# Patient Record
Sex: MALE | Race: WHITE | Employment: OTHER | URBAN - METROPOLITAN AREA
[De-identification: names, ages, dates, MRNs, and addresses within clinical notes are randomized per-mention and may not be internally consistent; named-entity substitution may affect disease eponyms.]

---

## 2019-09-17 ENCOUNTER — APPOINTMENT (OUTPATIENT)
Dept: CT IMAGING | Age: 69
End: 2019-09-17
Payer: MEDICARE

## 2019-09-17 ENCOUNTER — HOSPITAL ENCOUNTER (EMERGENCY)
Age: 69
Discharge: HOME | End: 2019-09-17
Attending: EMERGENCY MEDICINE
Payer: MEDICARE

## 2019-09-17 ENCOUNTER — APPOINTMENT (OUTPATIENT)
Dept: MRI IMAGING | Age: 69
End: 2019-09-17
Payer: MEDICARE

## 2019-09-17 ENCOUNTER — APPOINTMENT (OUTPATIENT)
Dept: GENERAL RADIOLOGY | Age: 69
End: 2019-09-17
Payer: MEDICARE

## 2019-09-17 VITALS
HEIGHT: 69 IN | BODY MASS INDEX: 25.92 KG/M2 | DIASTOLIC BLOOD PRESSURE: 74 MMHG | RESPIRATION RATE: 14 BRPM | TEMPERATURE: 97.6 F | HEART RATE: 73 BPM | OXYGEN SATURATION: 94 % | SYSTOLIC BLOOD PRESSURE: 114 MMHG | WEIGHT: 175 LBS

## 2019-09-17 DIAGNOSIS — R53.83 OTHER FATIGUE: Primary | ICD-10-CM

## 2019-09-17 LAB
ALBUMIN SERPL-MCNC: 3.7 GM/DL (ref 3.4–5)
ALP BLD-CCNC: 47 IU/L (ref 40–129)
ALT SERPL-CCNC: 14 U/L (ref 10–40)
AMMONIA: 26 UMOL/L (ref 16–60)
AMPHETAMINES: NEGATIVE
ANION GAP SERPL CALCULATED.3IONS-SCNC: 9 MMOL/L (ref 4–16)
AST SERPL-CCNC: 13 IU/L (ref 15–37)
BACTERIA: NEGATIVE /HPF
BARBITURATE SCREEN URINE: NEGATIVE
BASOPHILS ABSOLUTE: 0.1 K/CU MM
BASOPHILS RELATIVE PERCENT: 0.6 % (ref 0–1)
BENZODIAZEPINE SCREEN, URINE: NEGATIVE
BILIRUB SERPL-MCNC: 0.2 MG/DL (ref 0–1)
BILIRUBIN URINE: NEGATIVE MG/DL
BLOOD, URINE: NEGATIVE
BUN BLDV-MCNC: 12 MG/DL (ref 6–23)
CALCIUM SERPL-MCNC: 8.8 MG/DL (ref 8.3–10.6)
CANNABINOID SCREEN URINE: ABNORMAL
CHLORIDE BLD-SCNC: 100 MMOL/L (ref 99–110)
CLARITY: CLEAR
CO2: 22 MMOL/L (ref 21–32)
COCAINE METABOLITE: NEGATIVE
COLOR: ABNORMAL
CREAT SERPL-MCNC: 0.9 MG/DL (ref 0.9–1.3)
DIFFERENTIAL TYPE: ABNORMAL
EOSINOPHILS ABSOLUTE: 0.2 K/CU MM
EOSINOPHILS RELATIVE PERCENT: 2.4 % (ref 0–3)
GFR AFRICAN AMERICAN: >60 ML/MIN/1.73M2
GFR NON-AFRICAN AMERICAN: >60 ML/MIN/1.73M2
GLUCOSE BLD-MCNC: 115 MG/DL (ref 70–99)
GLUCOSE BLD-MCNC: 123 MG/DL (ref 70–99)
GLUCOSE, URINE: NEGATIVE MG/DL
HCT VFR BLD CALC: 36.8 % (ref 42–52)
HEMOGLOBIN: 12.1 GM/DL (ref 13.5–18)
HYALINE CASTS: 2 /LPF
IMMATURE NEUTROPHIL %: 0.3 % (ref 0–0.43)
KETONES, URINE: ABNORMAL MG/DL
LACTATE: 0.9 MMOL/L (ref 0.4–2)
LEUKOCYTE ESTERASE, URINE: NEGATIVE
LYMPHOCYTES ABSOLUTE: 2.2 K/CU MM
LYMPHOCYTES RELATIVE PERCENT: 27.5 % (ref 24–44)
MAGNESIUM: 2 MG/DL (ref 1.8–2.4)
MCH RBC QN AUTO: 31.8 PG (ref 27–31)
MCHC RBC AUTO-ENTMCNC: 32.9 % (ref 32–36)
MCV RBC AUTO: 96.8 FL (ref 78–100)
MONOCYTES ABSOLUTE: 0.6 K/CU MM
MONOCYTES RELATIVE PERCENT: 7.6 % (ref 0–4)
MUCUS: ABNORMAL HPF
NITRITE URINE, QUANTITATIVE: NEGATIVE
NUCLEATED RBC %: 0 %
OPIATES, URINE: NEGATIVE
OXYCODONE: ABNORMAL
PDW BLD-RTO: 12.9 % (ref 11.7–14.9)
PH, URINE: 6 (ref 5–8)
PHENCYCLIDINE, URINE: NEGATIVE
PHOSPHORUS: 2.6 MG/DL (ref 2.5–4.9)
PLATELET # BLD: 282 K/CU MM (ref 140–440)
PMV BLD AUTO: 9.3 FL (ref 7.5–11.1)
POTASSIUM SERPL-SCNC: 3.7 MMOL/L (ref 3.5–5.1)
PROTEIN UA: 30 MG/DL
RBC # BLD: 3.8 M/CU MM (ref 4.6–6.2)
RBC URINE: 1 /HPF (ref 0–3)
SEGMENTED NEUTROPHILS ABSOLUTE COUNT: 4.9 K/CU MM
SEGMENTED NEUTROPHILS RELATIVE PERCENT: 61.6 % (ref 36–66)
SODIUM BLD-SCNC: 131 MMOL/L (ref 135–145)
SPECIFIC GRAVITY UA: 1.02 (ref 1–1.03)
TOTAL IMMATURE NEUTOROPHIL: 0.02 K/CU MM
TOTAL NUCLEATED RBC: 0 K/CU MM
TOTAL PROTEIN: 6.5 GM/DL (ref 6.4–8.2)
TRICHOMONAS: ABNORMAL /HPF
TROPONIN T: <0.01 NG/ML
TSH HIGH SENSITIVITY: 1.02 UIU/ML (ref 0.27–4.2)
UROBILINOGEN, URINE: 1 MG/DL (ref 0.2–1)
WBC # BLD: 7.9 K/CU MM (ref 4–10.5)
WBC UA: 1 /HPF (ref 0–2)

## 2019-09-17 PROCEDURE — A9579 GAD-BASE MR CONTRAST NOS,1ML: HCPCS | Performed by: EMERGENCY MEDICINE

## 2019-09-17 PROCEDURE — 2580000003 HC RX 258: Performed by: EMERGENCY MEDICINE

## 2019-09-17 PROCEDURE — 36415 COLL VENOUS BLD VENIPUNCTURE: CPT

## 2019-09-17 PROCEDURE — 70450 CT HEAD/BRAIN W/O DYE: CPT

## 2019-09-17 PROCEDURE — 84100 ASSAY OF PHOSPHORUS: CPT

## 2019-09-17 PROCEDURE — 81001 URINALYSIS AUTO W/SCOPE: CPT

## 2019-09-17 PROCEDURE — 96361 HYDRATE IV INFUSION ADD-ON: CPT

## 2019-09-17 PROCEDURE — 6360000004 HC RX CONTRAST MEDICATION: Performed by: EMERGENCY MEDICINE

## 2019-09-17 PROCEDURE — 93005 ELECTROCARDIOGRAM TRACING: CPT | Performed by: EMERGENCY MEDICINE

## 2019-09-17 PROCEDURE — 83605 ASSAY OF LACTIC ACID: CPT

## 2019-09-17 PROCEDURE — 85025 COMPLETE CBC W/AUTO DIFF WBC: CPT

## 2019-09-17 PROCEDURE — 99285 EMERGENCY DEPT VISIT HI MDM: CPT

## 2019-09-17 PROCEDURE — 70553 MRI BRAIN STEM W/O & W/DYE: CPT

## 2019-09-17 PROCEDURE — 6360000002 HC RX W HCPCS

## 2019-09-17 PROCEDURE — 96374 THER/PROPH/DIAG INJ IV PUSH: CPT

## 2019-09-17 PROCEDURE — 87040 BLOOD CULTURE FOR BACTERIA: CPT

## 2019-09-17 PROCEDURE — 82962 GLUCOSE BLOOD TEST: CPT

## 2019-09-17 PROCEDURE — 80053 COMPREHEN METABOLIC PANEL: CPT

## 2019-09-17 PROCEDURE — 84484 ASSAY OF TROPONIN QUANT: CPT

## 2019-09-17 PROCEDURE — 87086 URINE CULTURE/COLONY COUNT: CPT

## 2019-09-17 PROCEDURE — 71045 X-RAY EXAM CHEST 1 VIEW: CPT

## 2019-09-17 PROCEDURE — 83735 ASSAY OF MAGNESIUM: CPT

## 2019-09-17 PROCEDURE — 80307 DRUG TEST PRSMV CHEM ANLYZR: CPT

## 2019-09-17 PROCEDURE — 82140 ASSAY OF AMMONIA: CPT

## 2019-09-17 PROCEDURE — 84443 ASSAY THYROID STIM HORMONE: CPT

## 2019-09-17 RX ORDER — RANOLAZINE 1000 MG/1
1000 TABLET, EXTENDED RELEASE ORAL 2 TIMES DAILY
COMMUNITY

## 2019-09-17 RX ORDER — ONDANSETRON 2 MG/ML
4 INJECTION INTRAMUSCULAR; INTRAVENOUS EVERY 6 HOURS PRN
Status: DISCONTINUED | OUTPATIENT
Start: 2019-09-17 | End: 2019-09-17 | Stop reason: HOSPADM

## 2019-09-17 RX ORDER — ATORVASTATIN CALCIUM 80 MG/1
80 TABLET, FILM COATED ORAL DAILY
COMMUNITY
End: 2020-06-10

## 2019-09-17 RX ORDER — 0.9 % SODIUM CHLORIDE 0.9 %
1000 INTRAVENOUS SOLUTION INTRAVENOUS ONCE
Status: COMPLETED | OUTPATIENT
Start: 2019-09-17 | End: 2019-09-17

## 2019-09-17 RX ORDER — ONDANSETRON 2 MG/ML
INJECTION INTRAMUSCULAR; INTRAVENOUS
Status: COMPLETED
Start: 2019-09-17 | End: 2019-09-17

## 2019-09-17 RX ADMIN — SODIUM CHLORIDE 1000 ML: 9 INJECTION, SOLUTION INTRAVENOUS at 12:45

## 2019-09-17 RX ADMIN — ONDANSETRON 4 MG: 2 INJECTION INTRAMUSCULAR; INTRAVENOUS at 12:46

## 2019-09-17 RX ADMIN — GADOTERIDOL 15 ML: 279.3 INJECTION, SOLUTION INTRAVENOUS at 16:15

## 2019-09-17 NOTE — ED NOTES
Discharge instructions reviewed with patient. follow up was discussed. Patient denies further questions and verbalizes understanding. Rides plus set up for patient,.       Carmen Craig RN  09/17/19 7237

## 2019-09-17 NOTE — PROGRESS NOTES
noncompliance, medication cost, therapy complete etc.):   Plavix no longer taking  Metoprolol succinate no longer taking  Simvastatin no longer taking    Other Comments:  Reviewed and updated med list per list sent from [address] [place] [address] Way does not know patients medications or last doses taken and patient unable to provide at time of assessment    To my knowledge the above medication history is accurate as of 9/17/2019 6:15 PM.   Domenic Rob CPhT   9/17/2019 6:15 PM

## 2019-09-17 NOTE — ED NOTES
Bed: ED-16  Expected date:   Expected time:   Means of arrival:   Comments:  ems     Enid Perez RN  09/17/19 [id number]

## 2019-09-17 NOTE — ED NOTES
Patient back from MRI. Ap Hyman Currently sleeping.  No signs of distress and call light in reach     Coleen Britton RN  09/17/19 [id number]

## 2019-09-17 NOTE — ED NOTES
Patient to MRI at this time. Family updated and denies needs.       Coleen Britton RN  09/17/19 [id number]

## 2019-09-18 PROCEDURE — 93010 ELECTROCARDIOGRAM REPORT: CPT | Performed by: INTERNAL MEDICINE

## 2019-09-19 LAB
CULTURE: NORMAL
Lab: NORMAL
SPECIMEN: NORMAL

## 2019-09-20 LAB
EKG ATRIAL RATE: 79 BPM
EKG DIAGNOSIS: NORMAL
EKG P AXIS: 38 DEGREES
EKG P-R INTERVAL: 180 MS
EKG Q-T INTERVAL: 390 MS
EKG QRS DURATION: 88 MS
EKG QTC CALCULATION (BAZETT): 447 MS
EKG R AXIS: 3 DEGREES
EKG T AXIS: 42 DEGREES
EKG VENTRICULAR RATE: 79 BPM

## 2019-09-22 LAB
CULTURE: NORMAL
CULTURE: NORMAL
Lab: NORMAL
Lab: NORMAL
SPECIMEN: NORMAL
SPECIMEN: NORMAL

## 2019-10-13 ENCOUNTER — HOSPITAL ENCOUNTER (OUTPATIENT)
Age: 69
Setting detail: OBSERVATION
Discharge: HOME | End: 2019-10-14
Attending: EMERGENCY MEDICINE | Admitting: INTERNAL MEDICINE
Payer: MEDICARE

## 2019-10-13 ENCOUNTER — APPOINTMENT (OUTPATIENT)
Dept: GENERAL RADIOLOGY | Age: 69
End: 2019-10-13
Payer: MEDICARE

## 2019-10-13 DIAGNOSIS — R07.9 CHEST PAIN, UNSPECIFIED TYPE: Primary | ICD-10-CM

## 2019-10-13 DIAGNOSIS — R91.8 ABNORMALITY OF LUNG ON CXR: ICD-10-CM

## 2019-10-13 LAB
ALBUMIN SERPL-MCNC: 4.1 GM/DL (ref 3.4–5)
ALP BLD-CCNC: 50 IU/L (ref 40–129)
ALT SERPL-CCNC: 16 U/L (ref 10–40)
ANION GAP SERPL CALCULATED.3IONS-SCNC: 6 MMOL/L (ref 4–16)
AST SERPL-CCNC: 14 IU/L (ref 15–37)
BASOPHILS ABSOLUTE: 0.1 K/CU MM
BASOPHILS RELATIVE PERCENT: 0.9 % (ref 0–1)
BILIRUB SERPL-MCNC: 0.2 MG/DL (ref 0–1)
BUN BLDV-MCNC: 10 MG/DL (ref 6–23)
CALCIUM SERPL-MCNC: 8.7 MG/DL (ref 8.3–10.6)
CHLORIDE BLD-SCNC: 102 MMOL/L (ref 99–110)
CO2: 28 MMOL/L (ref 21–32)
CREAT SERPL-MCNC: 0.9 MG/DL (ref 0.9–1.3)
DIFFERENTIAL TYPE: ABNORMAL
EOSINOPHILS ABSOLUTE: 0.3 K/CU MM
EOSINOPHILS RELATIVE PERCENT: 3.4 % (ref 0–3)
GFR AFRICAN AMERICAN: >60 ML/MIN/1.73M2
GFR NON-AFRICAN AMERICAN: >60 ML/MIN/1.73M2
GLUCOSE BLD-MCNC: 92 MG/DL (ref 70–99)
HCT VFR BLD CALC: 38.4 % (ref 42–52)
HEMOGLOBIN: 12.6 GM/DL (ref 13.5–18)
IMMATURE NEUTROPHIL %: 0.1 % (ref 0–0.43)
LYMPHOCYTES ABSOLUTE: 3 K/CU MM
LYMPHOCYTES RELATIVE PERCENT: 36.5 % (ref 24–44)
MCH RBC QN AUTO: 32.2 PG (ref 27–31)
MCHC RBC AUTO-ENTMCNC: 32.8 % (ref 32–36)
MCV RBC AUTO: 98.2 FL (ref 78–100)
MONOCYTES ABSOLUTE: 0.8 K/CU MM
MONOCYTES RELATIVE PERCENT: 9.3 % (ref 0–4)
NUCLEATED RBC %: 0 %
PDW BLD-RTO: 13.2 % (ref 11.7–14.9)
PLATELET # BLD: 328 K/CU MM (ref 140–440)
PMV BLD AUTO: 9 FL (ref 7.5–11.1)
POTASSIUM SERPL-SCNC: 3.9 MMOL/L (ref 3.5–5.1)
PRO-BNP: 26.19 PG/ML
RBC # BLD: 3.91 M/CU MM (ref 4.6–6.2)
SEGMENTED NEUTROPHILS ABSOLUTE COUNT: 4.1 K/CU MM
SEGMENTED NEUTROPHILS RELATIVE PERCENT: 49.8 % (ref 36–66)
SODIUM BLD-SCNC: 136 MMOL/L (ref 135–145)
TOTAL IMMATURE NEUTOROPHIL: 0.01 K/CU MM
TOTAL NUCLEATED RBC: 0 K/CU MM
TOTAL PROTEIN: 7 GM/DL (ref 6.4–8.2)
TROPONIN T: <0.01 NG/ML
WBC # BLD: 8.2 K/CU MM (ref 4–10.5)

## 2019-10-13 PROCEDURE — 83880 ASSAY OF NATRIURETIC PEPTIDE: CPT

## 2019-10-13 PROCEDURE — 93005 ELECTROCARDIOGRAM TRACING: CPT | Performed by: PHYSICIAN ASSISTANT

## 2019-10-13 PROCEDURE — 84484 ASSAY OF TROPONIN QUANT: CPT

## 2019-10-13 PROCEDURE — 85025 COMPLETE CBC W/AUTO DIFF WBC: CPT

## 2019-10-13 PROCEDURE — 71045 X-RAY EXAM CHEST 1 VIEW: CPT

## 2019-10-13 PROCEDURE — 99285 EMERGENCY DEPT VISIT HI MDM: CPT

## 2019-10-13 PROCEDURE — 80053 COMPREHEN METABOLIC PANEL: CPT

## 2019-10-13 PROCEDURE — 94761 N-INVAS EAR/PLS OXIMETRY MLT: CPT

## 2019-10-13 PROCEDURE — 6370000000 HC RX 637 (ALT 250 FOR IP): Performed by: PHYSICIAN ASSISTANT

## 2019-10-13 RX ORDER — ASPIRIN 81 MG/1
162 TABLET, CHEWABLE ORAL ONCE
Status: COMPLETED | OUTPATIENT
Start: 2019-10-13 | End: 2019-10-13

## 2019-10-13 RX ADMIN — ASPIRIN 162 MG: 81 TABLET, CHEWABLE ORAL at 23:24

## 2019-10-13 ASSESSMENT — HEART SCORE: ECG: 0

## 2019-10-13 ASSESSMENT — PAIN SCALES - GENERAL: PAINLEVEL_OUTOF10: 7

## 2019-10-13 ASSESSMENT — PAIN DESCRIPTION - LOCATION: LOCATION: CHEST

## 2019-10-14 ENCOUNTER — APPOINTMENT (OUTPATIENT)
Dept: NUCLEAR MEDICINE | Age: 69
End: 2019-10-14
Payer: MEDICARE

## 2019-10-14 ENCOUNTER — APPOINTMENT (OUTPATIENT)
Dept: CT IMAGING | Age: 69
End: 2019-10-14
Payer: MEDICARE

## 2019-10-14 ENCOUNTER — APPOINTMENT (OUTPATIENT)
Dept: ULTRASOUND IMAGING | Age: 69
End: 2019-10-14
Payer: MEDICARE

## 2019-10-14 VITALS
OXYGEN SATURATION: 97 % | DIASTOLIC BLOOD PRESSURE: 67 MMHG | HEIGHT: 69 IN | HEART RATE: 60 BPM | SYSTOLIC BLOOD PRESSURE: 121 MMHG | WEIGHT: 178.5 LBS | BODY MASS INDEX: 26.44 KG/M2 | RESPIRATION RATE: 15 BRPM | TEMPERATURE: 97.5 F

## 2019-10-14 LAB
APTT: 27.5 SECONDS (ref 21.2–33)
BACTERIA: NEGATIVE /HPF
BILIRUBIN URINE: NEGATIVE MG/DL
BLOOD, URINE: NEGATIVE
CLARITY: CLEAR
COLOR: YELLOW
EKG ATRIAL RATE: 58 BPM
EKG ATRIAL RATE: 67 BPM
EKG ATRIAL RATE: 71 BPM
EKG DIAGNOSIS: NORMAL
EKG P AXIS: 27 DEGREES
EKG P AXIS: 42 DEGREES
EKG P AXIS: 65 DEGREES
EKG P-R INTERVAL: 182 MS
EKG P-R INTERVAL: 184 MS
EKG P-R INTERVAL: 186 MS
EKG Q-T INTERVAL: 376 MS
EKG Q-T INTERVAL: 392 MS
EKG Q-T INTERVAL: 414 MS
EKG QRS DURATION: 82 MS
EKG QRS DURATION: 86 MS
EKG QRS DURATION: 86 MS
EKG QTC CALCULATION (BAZETT): 406 MS
EKG QTC CALCULATION (BAZETT): 408 MS
EKG QTC CALCULATION (BAZETT): 414 MS
EKG R AXIS: 20 DEGREES
EKG R AXIS: 21 DEGREES
EKG R AXIS: 5 DEGREES
EKG T AXIS: 29 DEGREES
EKG T AXIS: 48 DEGREES
EKG T AXIS: 54 DEGREES
EKG VENTRICULAR RATE: 58 BPM
EKG VENTRICULAR RATE: 67 BPM
EKG VENTRICULAR RATE: 71 BPM
GLUCOSE, URINE: NEGATIVE MG/DL
HCT VFR BLD CALC: 37.1 % (ref 42–52)
HEMOGLOBIN: 12.1 GM/DL (ref 13.5–18)
INR BLD: 1 INDEX
KETONES, URINE: NEGATIVE MG/DL
LEUKOCYTE ESTERASE, URINE: NEGATIVE
LV EF: 58 %
LV EF: 68 %
LVEF MODALITY: NORMAL
LVEF MODALITY: NORMAL
MCH RBC QN AUTO: 32.1 PG (ref 27–31)
MCHC RBC AUTO-ENTMCNC: 32.6 % (ref 32–36)
MCV RBC AUTO: 98.4 FL (ref 78–100)
NITRITE URINE, QUANTITATIVE: NEGATIVE
PDW BLD-RTO: 13.1 % (ref 11.7–14.9)
PH, URINE: 8 (ref 5–8)
PLATELET # BLD: 292 K/CU MM (ref 140–440)
PMV BLD AUTO: 8.9 FL (ref 7.5–11.1)
PROTEIN UA: NEGATIVE MG/DL
PROTHROMBIN TIME: 11.4 SECONDS (ref 11.7–14.5)
RBC # BLD: 3.77 M/CU MM (ref 4.6–6.2)
RBC URINE: <1 /HPF (ref 0–3)
SPECIFIC GRAVITY UA: 1.04 (ref 1–1.03)
SPECIFIC GRAVITY UA: ABNORMAL (ref 1–1.03)
TRICHOMONAS: ABNORMAL /HPF
TROPONIN T: <0.01 NG/ML
TROPONIN T: <0.01 NG/ML
UROBILINOGEN, URINE: NORMAL MG/DL (ref 0.2–1)
WBC # BLD: 5.7 K/CU MM (ref 4–10.5)
WBC UA: <1 /HPF (ref 0–2)

## 2019-10-14 PROCEDURE — 2580000003 HC RX 258: Performed by: INTERNAL MEDICINE

## 2019-10-14 PROCEDURE — 85610 PROTHROMBIN TIME: CPT

## 2019-10-14 PROCEDURE — 93010 ELECTROCARDIOGRAM REPORT: CPT | Performed by: INTERNAL MEDICINE

## 2019-10-14 PROCEDURE — 6370000000 HC RX 637 (ALT 250 FOR IP): Performed by: INTERNAL MEDICINE

## 2019-10-14 PROCEDURE — 85027 COMPLETE CBC AUTOMATED: CPT

## 2019-10-14 PROCEDURE — 6360000004 HC RX CONTRAST MEDICATION: Performed by: INTERNAL MEDICINE

## 2019-10-14 PROCEDURE — 85730 THROMBOPLASTIN TIME PARTIAL: CPT

## 2019-10-14 PROCEDURE — A9500 TC99M SESTAMIBI: HCPCS | Performed by: INTERNAL MEDICINE

## 2019-10-14 PROCEDURE — 6360000002 HC RX W HCPCS: Performed by: INTERNAL MEDICINE

## 2019-10-14 PROCEDURE — 81001 URINALYSIS AUTO W/SCOPE: CPT

## 2019-10-14 PROCEDURE — 78452 HT MUSCLE IMAGE SPECT MULT: CPT

## 2019-10-14 PROCEDURE — 3430000000 HC RX DIAGNOSTIC RADIOPHARMACEUTICAL: Performed by: INTERNAL MEDICINE

## 2019-10-14 PROCEDURE — 84484 ASSAY OF TROPONIN QUANT: CPT

## 2019-10-14 PROCEDURE — G0378 HOSPITAL OBSERVATION PER HR: HCPCS

## 2019-10-14 PROCEDURE — 93306 TTE W/DOPPLER COMPLETE: CPT

## 2019-10-14 PROCEDURE — 94761 N-INVAS EAR/PLS OXIMETRY MLT: CPT

## 2019-10-14 PROCEDURE — 93017 CV STRESS TEST TRACING ONLY: CPT

## 2019-10-14 PROCEDURE — 93005 ELECTROCARDIOGRAM TRACING: CPT | Performed by: INTERNAL MEDICINE

## 2019-10-14 PROCEDURE — 71275 CT ANGIOGRAPHY CHEST: CPT

## 2019-10-14 PROCEDURE — 36415 COLL VENOUS BLD VENIPUNCTURE: CPT

## 2019-10-14 RX ORDER — NITROGLYCERIN 0.4 MG/1
0.4 TABLET SUBLINGUAL EVERY 5 MIN PRN
Status: DISCONTINUED | OUTPATIENT
Start: 2019-10-14 | End: 2019-10-14 | Stop reason: HOSPADM

## 2019-10-14 RX ORDER — PREDNISONE 20 MG/1
40 TABLET ORAL DAILY
Qty: 10 TABLET | Refills: 0 | Status: SHIPPED | OUTPATIENT
Start: 2019-10-14 | End: 2019-10-19

## 2019-10-14 RX ORDER — ATORVASTATIN CALCIUM 40 MG/1
80 TABLET, FILM COATED ORAL DAILY
Status: DISCONTINUED | OUTPATIENT
Start: 2019-10-14 | End: 2019-10-14 | Stop reason: HOSPADM

## 2019-10-14 RX ORDER — SODIUM CHLORIDE 0.9 % (FLUSH) 0.9 %
10 SYRINGE (ML) INJECTION EVERY 12 HOURS SCHEDULED
Status: DISCONTINUED | OUTPATIENT
Start: 2019-10-14 | End: 2019-10-14 | Stop reason: HOSPADM

## 2019-10-14 RX ORDER — SODIUM CHLORIDE 9 MG/ML
INJECTION, SOLUTION INTRAVENOUS CONTINUOUS
Status: DISCONTINUED | OUTPATIENT
Start: 2019-10-14 | End: 2019-10-14 | Stop reason: HOSPADM

## 2019-10-14 RX ORDER — ONDANSETRON 2 MG/ML
4 INJECTION INTRAMUSCULAR; INTRAVENOUS EVERY 6 HOURS PRN
Status: DISCONTINUED | OUTPATIENT
Start: 2019-10-14 | End: 2019-10-14 | Stop reason: HOSPADM

## 2019-10-14 RX ORDER — DOXYCYCLINE HYCLATE 100 MG
100 TABLET ORAL 2 TIMES DAILY
Qty: 10 TABLET | Refills: 0 | Status: SHIPPED | OUTPATIENT
Start: 2019-10-14 | End: 2019-10-19

## 2019-10-14 RX ORDER — LISINOPRIL 5 MG/1
5 TABLET ORAL DAILY
Status: DISCONTINUED | OUTPATIENT
Start: 2019-10-14 | End: 2019-10-14 | Stop reason: HOSPADM

## 2019-10-14 RX ORDER — CLOPIDOGREL BISULFATE 75 MG/1
75 TABLET ORAL DAILY
COMMUNITY

## 2019-10-14 RX ORDER — SODIUM CHLORIDE 0.9 % (FLUSH) 0.9 %
10 SYRINGE (ML) INJECTION PRN
Status: DISCONTINUED | OUTPATIENT
Start: 2019-10-14 | End: 2019-10-14 | Stop reason: HOSPADM

## 2019-10-14 RX ORDER — IPRATROPIUM BROMIDE AND ALBUTEROL SULFATE 2.5; .5 MG/3ML; MG/3ML
1 SOLUTION RESPIRATORY (INHALATION) EVERY 4 HOURS
Qty: 360 ML | Refills: 0 | Status: SHIPPED | OUTPATIENT
Start: 2019-10-14 | End: 2020-06-10

## 2019-10-14 RX ORDER — RANOLAZINE 500 MG/1
1000 TABLET, EXTENDED RELEASE ORAL 2 TIMES DAILY
Status: DISCONTINUED | OUTPATIENT
Start: 2019-10-14 | End: 2019-10-14 | Stop reason: HOSPADM

## 2019-10-14 RX ORDER — GABAPENTIN 300 MG/1
600 CAPSULE ORAL 2 TIMES DAILY
Status: DISCONTINUED | OUTPATIENT
Start: 2019-10-14 | End: 2019-10-14 | Stop reason: HOSPADM

## 2019-10-14 RX ORDER — LANOLIN ALCOHOL/MO/W.PET/CERES
500 CREAM (GRAM) TOPICAL DAILY
Status: DISCONTINUED | OUTPATIENT
Start: 2019-10-14 | End: 2019-10-14 | Stop reason: HOSPADM

## 2019-10-14 RX ORDER — ACETAMINOPHEN 80 MG
TABLET,CHEWABLE ORAL
Status: COMPLETED
Start: 2019-10-14 | End: 2019-10-14

## 2019-10-14 RX ORDER — CLOPIDOGREL BISULFATE 75 MG/1
75 TABLET ORAL DAILY
Status: DISCONTINUED | OUTPATIENT
Start: 2019-10-14 | End: 2019-10-14 | Stop reason: HOSPADM

## 2019-10-14 RX ORDER — ALBUTEROL SULFATE 90 UG/1
2 AEROSOL, METERED RESPIRATORY (INHALATION) EVERY 6 HOURS PRN
Status: DISCONTINUED | OUTPATIENT
Start: 2019-10-14 | End: 2019-10-14 | Stop reason: HOSPADM

## 2019-10-14 RX ORDER — ASPIRIN 81 MG/1
81 TABLET, CHEWABLE ORAL DAILY
Status: DISCONTINUED | OUTPATIENT
Start: 2019-10-14 | End: 2019-10-14 | Stop reason: HOSPADM

## 2019-10-14 RX ADMIN — CLOPIDOGREL BISULFATE 75 MG: 75 TABLET ORAL at 10:46

## 2019-10-14 RX ADMIN — GABAPENTIN 600 MG: 300 CAPSULE ORAL at 00:44

## 2019-10-14 RX ADMIN — REGADENOSON 0.4 MG: 0.08 INJECTION, SOLUTION INTRAVENOUS at 09:08

## 2019-10-14 RX ADMIN — RANOLAZINE 1000 MG: 500 TABLET, FILM COATED, EXTENDED RELEASE ORAL at 10:45

## 2019-10-14 RX ADMIN — Medication: at 00:44

## 2019-10-14 RX ADMIN — RANOLAZINE 1000 MG: 500 TABLET, FILM COATED, EXTENDED RELEASE ORAL at 00:44

## 2019-10-14 RX ADMIN — ASPIRIN 81 MG 81 MG: 81 TABLET ORAL at 10:47

## 2019-10-14 RX ADMIN — LISINOPRIL 5 MG: 5 TABLET ORAL at 10:47

## 2019-10-14 RX ADMIN — Medication 10 ML: at 10:48

## 2019-10-14 RX ADMIN — METOPROLOL TARTRATE 12.5 MG: 25 TABLET ORAL at 10:46

## 2019-10-14 RX ADMIN — Medication 10 MILLICURIE: at 07:25

## 2019-10-14 RX ADMIN — METOPROLOL TARTRATE 12.5 MG: 25 TABLET ORAL at 00:44

## 2019-10-14 RX ADMIN — CYANOCOBALAMIN TAB 1000 MCG 500 MCG: 1000 TAB at 10:47

## 2019-10-14 RX ADMIN — GABAPENTIN 600 MG: 300 CAPSULE ORAL at 10:46

## 2019-10-14 RX ADMIN — SODIUM CHLORIDE: 9 INJECTION, SOLUTION INTRAVENOUS at 10:45

## 2019-10-14 RX ADMIN — IOPAMIDOL 75 ML: 755 INJECTION, SOLUTION INTRAVENOUS at 10:28

## 2019-10-14 RX ADMIN — Medication 30 MILLICURIE: at 09:10

## 2019-10-14 ASSESSMENT — PAIN DESCRIPTION - PAIN TYPE
TYPE: ACUTE PAIN
TYPE: ACUTE PAIN

## 2019-10-14 ASSESSMENT — PAIN DESCRIPTION - PROGRESSION
CLINICAL_PROGRESSION: NOT CHANGED

## 2019-10-14 ASSESSMENT — PAIN DESCRIPTION - FREQUENCY
FREQUENCY: INTERMITTENT
FREQUENCY: CONTINUOUS

## 2019-10-14 ASSESSMENT — PAIN DESCRIPTION - LOCATION
LOCATION: CHEST
LOCATION: CHEST

## 2019-10-14 ASSESSMENT — PAIN SCALES - GENERAL
PAINLEVEL_OUTOF10: 7
PAINLEVEL_OUTOF10: 5

## 2019-10-14 ASSESSMENT — PAIN DESCRIPTION - ORIENTATION
ORIENTATION: MID
ORIENTATION: MID;LEFT

## 2019-10-14 ASSESSMENT — PAIN DESCRIPTION - DESCRIPTORS
DESCRIPTORS: SORE
DESCRIPTORS: SORE

## 2019-10-14 ASSESSMENT — PAIN - FUNCTIONAL ASSESSMENT: PAIN_FUNCTIONAL_ASSESSMENT: ACTIVITIES ARE NOT PREVENTED

## 2019-10-14 ASSESSMENT — PAIN DESCRIPTION - DIRECTION: RADIATING_TOWARDS: LEFT

## 2019-10-14 ASSESSMENT — PAIN DESCRIPTION - ONSET
ONSET: ON-GOING
ONSET: ON-GOING

## 2019-10-20 PROBLEM — Z95.5 STENTED CORONARY ARTERY: Status: ACTIVE | Noted: 2019-10-20

## 2019-10-20 PROBLEM — Z92.89 HISTORY OF CARDIAC MONITORING: Status: ACTIVE | Noted: 2019-10-20

## 2019-11-14 ENCOUNTER — TELEPHONE (OUTPATIENT)
Dept: CARDIOLOGY CLINIC | Age: 69
End: 2019-11-14

## 2019-11-18 ENCOUNTER — INITIAL CONSULT (OUTPATIENT)
Dept: CARDIOLOGY CLINIC | Age: 69
End: 2019-11-18
Payer: OTHER GOVERNMENT

## 2019-11-18 VITALS
WEIGHT: 172.4 LBS | RESPIRATION RATE: 18 BRPM | SYSTOLIC BLOOD PRESSURE: 118 MMHG | DIASTOLIC BLOOD PRESSURE: 64 MMHG | BODY MASS INDEX: 25.46 KG/M2 | HEART RATE: 78 BPM

## 2019-11-18 DIAGNOSIS — R55 ATYPICAL SYNCOPE: Primary | ICD-10-CM

## 2019-11-18 DIAGNOSIS — R55 SYNCOPE AND COLLAPSE: ICD-10-CM

## 2019-11-18 DIAGNOSIS — I25.10 ASCVD (ARTERIOSCLEROTIC CARDIOVASCULAR DISEASE): ICD-10-CM

## 2019-11-18 DIAGNOSIS — Z95.5 STENTED CORONARY ARTERY: ICD-10-CM

## 2019-11-18 DIAGNOSIS — Z72.0 TOBACCO ABUSE: ICD-10-CM

## 2019-11-18 PROCEDURE — 99204 OFFICE O/P NEW MOD 45 MIN: CPT | Performed by: INTERNAL MEDICINE

## 2019-11-18 PROCEDURE — 93000 ELECTROCARDIOGRAM COMPLETE: CPT | Performed by: INTERNAL MEDICINE

## 2019-11-18 RX ORDER — OMEPRAZOLE 20 MG/1
20 CAPSULE, DELAYED RELEASE ORAL DAILY
Status: ON HOLD | COMMUNITY
End: 2022-04-11

## 2019-11-18 RX ORDER — NICOTINE 21-14-7MG
KIT TRANSDERMAL
COMMUNITY
End: 2020-06-10

## 2019-11-20 ENCOUNTER — TELEPHONE (OUTPATIENT)
Dept: CARDIOLOGY CLINIC | Age: 69
End: 2019-11-20

## 2020-06-10 ENCOUNTER — APPOINTMENT (OUTPATIENT)
Dept: CT IMAGING | Age: 70
End: 2020-06-10
Payer: MEDICARE

## 2020-06-10 ENCOUNTER — APPOINTMENT (OUTPATIENT)
Dept: GENERAL RADIOLOGY | Age: 70
End: 2020-06-10
Payer: MEDICARE

## 2020-06-10 ENCOUNTER — HOSPITAL ENCOUNTER (OUTPATIENT)
Age: 70
Setting detail: OBSERVATION
Discharge: HOME | End: 2020-06-11
Attending: EMERGENCY MEDICINE | Admitting: INTERNAL MEDICINE
Payer: MEDICARE

## 2020-06-10 PROBLEM — G93.40 ENCEPHALOPATHY ACUTE: Status: ACTIVE | Noted: 2020-06-10

## 2020-06-10 LAB
ACETAMINOPHEN LEVEL: <5 UG/ML (ref 15–30)
ALBUMIN SERPL-MCNC: 3.8 GM/DL (ref 3.4–5)
ALCOHOL SCREEN SERUM: <0.01 %WT/VOL
ALP BLD-CCNC: 57 IU/L (ref 40–129)
ALT SERPL-CCNC: 15 U/L (ref 10–40)
AMMONIA: 35 UMOL/L (ref 16–60)
AMPHETAMINES: NEGATIVE
ANION GAP SERPL CALCULATED.3IONS-SCNC: 11 MMOL/L (ref 4–16)
AST SERPL-CCNC: 12 IU/L (ref 15–37)
BACTERIA: NEGATIVE /HPF
BARBITURATE SCREEN URINE: NEGATIVE
BASE EXCESS: 2 (ref 0–3.3)
BASOPHILS ABSOLUTE: 0 K/CU MM
BASOPHILS RELATIVE PERCENT: 0.1 % (ref 0–1)
BENZODIAZEPINE SCREEN, URINE: NEGATIVE
BILIRUB SERPL-MCNC: 0.3 MG/DL (ref 0–1)
BILIRUBIN URINE: NEGATIVE MG/DL
BLOOD, URINE: NEGATIVE
BUN BLDV-MCNC: 13 MG/DL (ref 6–23)
CALCIUM SERPL-MCNC: 8.9 MG/DL (ref 8.3–10.6)
CANNABINOID SCREEN URINE: ABNORMAL
CHLORIDE BLD-SCNC: 100 MMOL/L (ref 99–110)
CHP ED QC CHECK: YES
CLARITY: CLEAR
CO2: 22 MMOL/L (ref 21–32)
COCAINE METABOLITE: NEGATIVE
COLOR: YELLOW
COMMENT: ABNORMAL
CREAT SERPL-MCNC: 0.8 MG/DL (ref 0.9–1.3)
DIFFERENTIAL TYPE: ABNORMAL
DOSE AMOUNT: ABNORMAL
DOSE AMOUNT: ABNORMAL
DOSE TIME: ABNORMAL
DOSE TIME: ABNORMAL
EKG ATRIAL RATE: 61 BPM
EKG DIAGNOSIS: NORMAL
EKG P AXIS: 50 DEGREES
EKG P-R INTERVAL: 196 MS
EKG Q-T INTERVAL: 428 MS
EKG QRS DURATION: 90 MS
EKG QTC CALCULATION (BAZETT): 430 MS
EKG R AXIS: 20 DEGREES
EKG T AXIS: 53 DEGREES
EKG VENTRICULAR RATE: 61 BPM
EOSINOPHILS ABSOLUTE: 0.2 K/CU MM
EOSINOPHILS RELATIVE PERCENT: 2.5 % (ref 0–3)
GFR AFRICAN AMERICAN: >60 ML/MIN/1.73M2
GFR NON-AFRICAN AMERICAN: >60 ML/MIN/1.73M2
GLUCOSE BLD-MCNC: 125 MG/DL
GLUCOSE BLD-MCNC: 125 MG/DL (ref 70–99)
GLUCOSE BLD-MCNC: 129 MG/DL (ref 70–99)
GLUCOSE, URINE: NEGATIVE MG/DL
HCO3 VENOUS: 23.7 MMOL/L (ref 19–25)
HCT VFR BLD CALC: 36.4 % (ref 42–52)
HEMOGLOBIN: 12 GM/DL (ref 13.5–18)
IMMATURE NEUTROPHIL %: 0.4 % (ref 0–0.43)
KETONES, URINE: NEGATIVE MG/DL
LACTIC ACID, SEPSIS: 1.2 MMOL/L (ref 0.5–1.9)
LACTIC ACID, SEPSIS: 1.9 MMOL/L (ref 0.5–1.9)
LEUKOCYTE ESTERASE, URINE: NEGATIVE
LIPASE: 15 IU/L (ref 13–60)
LYMPHOCYTES ABSOLUTE: 1.7 K/CU MM
LYMPHOCYTES RELATIVE PERCENT: 20 % (ref 24–44)
MCH RBC QN AUTO: 32.2 PG (ref 27–31)
MCHC RBC AUTO-ENTMCNC: 33 % (ref 32–36)
MCV RBC AUTO: 97.6 FL (ref 78–100)
MONOCYTES ABSOLUTE: 0.4 K/CU MM
MONOCYTES RELATIVE PERCENT: 5.1 % (ref 0–4)
MUCUS: ABNORMAL HPF
NITRITE URINE, QUANTITATIVE: NEGATIVE
NUCLEATED RBC %: 0 %
O2 SAT, VEN: 88.1 % (ref 50–70)
OPIATES, URINE: NEGATIVE
OXYCODONE: NEGATIVE
PCO2, VEN: 44 MMHG (ref 38–52)
PDW BLD-RTO: 13.1 % (ref 11.7–14.9)
PH VENOUS: 7.34 (ref 7.32–7.42)
PH, URINE: 6 (ref 5–8)
PHENCYCLIDINE, URINE: NEGATIVE
PLATELET # BLD: 305 K/CU MM (ref 140–440)
PMV BLD AUTO: 9 FL (ref 7.5–11.1)
PO2, VEN: 88 MMHG (ref 28–48)
POTASSIUM SERPL-SCNC: 3.7 MMOL/L (ref 3.5–5.1)
PRO-BNP: 50.88 PG/ML
PROTEIN UA: NEGATIVE MG/DL
RBC # BLD: 3.73 M/CU MM (ref 4.6–6.2)
RBC URINE: <1 /HPF (ref 0–3)
SALICYLATE LEVEL: <0.3 MG/DL (ref 15–30)
SEGMENTED NEUTROPHILS ABSOLUTE COUNT: 6.2 K/CU MM
SEGMENTED NEUTROPHILS RELATIVE PERCENT: 71.9 % (ref 36–66)
SODIUM BLD-SCNC: 133 MMOL/L (ref 135–145)
SPECIFIC GRAVITY UA: 1.02 (ref 1–1.03)
SQUAMOUS EPITHELIAL: <1 /HPF
TOTAL IMMATURE NEUTOROPHIL: 0.03 K/CU MM
TOTAL NUCLEATED RBC: 0 K/CU MM
TOTAL PROTEIN: 6.9 GM/DL (ref 6.4–8.2)
TRICHOMONAS: ABNORMAL /HPF
TROPONIN T: <0.01 NG/ML
TSH HIGH SENSITIVITY: 1.26 UIU/ML (ref 0.27–4.2)
UROBILINOGEN, URINE: NORMAL MG/DL (ref 0.2–1)
WBC # BLD: 8.6 K/CU MM (ref 4–10.5)
WBC UA: <1 /HPF (ref 0–2)

## 2020-06-10 PROCEDURE — 84484 ASSAY OF TROPONIN QUANT: CPT

## 2020-06-10 PROCEDURE — 82140 ASSAY OF AMMONIA: CPT

## 2020-06-10 PROCEDURE — G0378 HOSPITAL OBSERVATION PER HR: HCPCS

## 2020-06-10 PROCEDURE — 6370000000 HC RX 637 (ALT 250 FOR IP): Performed by: INTERNAL MEDICINE

## 2020-06-10 PROCEDURE — G0480 DRUG TEST DEF 1-7 CLASSES: HCPCS

## 2020-06-10 PROCEDURE — 96374 THER/PROPH/DIAG INJ IV PUSH: CPT

## 2020-06-10 PROCEDURE — 84443 ASSAY THYROID STIM HORMONE: CPT

## 2020-06-10 PROCEDURE — 6370000000 HC RX 637 (ALT 250 FOR IP): Performed by: PHYSICIAN ASSISTANT

## 2020-06-10 PROCEDURE — 36415 COLL VENOUS BLD VENIPUNCTURE: CPT

## 2020-06-10 PROCEDURE — 2580000003 HC RX 258: Performed by: INTERNAL MEDICINE

## 2020-06-10 PROCEDURE — 80053 COMPREHEN METABOLIC PANEL: CPT

## 2020-06-10 PROCEDURE — 82962 GLUCOSE BLOOD TEST: CPT

## 2020-06-10 PROCEDURE — 83690 ASSAY OF LIPASE: CPT

## 2020-06-10 PROCEDURE — 70450 CT HEAD/BRAIN W/O DYE: CPT

## 2020-06-10 PROCEDURE — 94761 N-INVAS EAR/PLS OXIMETRY MLT: CPT

## 2020-06-10 PROCEDURE — 81001 URINALYSIS AUTO W/SCOPE: CPT

## 2020-06-10 PROCEDURE — 71045 X-RAY EXAM CHEST 1 VIEW: CPT

## 2020-06-10 PROCEDURE — 82805 BLOOD GASES W/O2 SATURATION: CPT

## 2020-06-10 PROCEDURE — 87040 BLOOD CULTURE FOR BACTERIA: CPT

## 2020-06-10 PROCEDURE — 83880 ASSAY OF NATRIURETIC PEPTIDE: CPT

## 2020-06-10 PROCEDURE — 93010 ELECTROCARDIOGRAM REPORT: CPT | Performed by: INTERNAL MEDICINE

## 2020-06-10 PROCEDURE — 83605 ASSAY OF LACTIC ACID: CPT

## 2020-06-10 PROCEDURE — 6360000002 HC RX W HCPCS: Performed by: PHYSICIAN ASSISTANT

## 2020-06-10 PROCEDURE — 93005 ELECTROCARDIOGRAM TRACING: CPT | Performed by: PHYSICIAN ASSISTANT

## 2020-06-10 PROCEDURE — 2580000003 HC RX 258: Performed by: PHYSICIAN ASSISTANT

## 2020-06-10 PROCEDURE — 93005 ELECTROCARDIOGRAM TRACING: CPT | Performed by: EMERGENCY MEDICINE

## 2020-06-10 PROCEDURE — 85025 COMPLETE CBC W/AUTO DIFF WBC: CPT

## 2020-06-10 PROCEDURE — 99291 CRITICAL CARE FIRST HOUR: CPT

## 2020-06-10 PROCEDURE — 80307 DRUG TEST PRSMV CHEM ANLYZR: CPT

## 2020-06-10 PROCEDURE — 94640 AIRWAY INHALATION TREATMENT: CPT

## 2020-06-10 PROCEDURE — 6360000002 HC RX W HCPCS: Performed by: INTERNAL MEDICINE

## 2020-06-10 RX ORDER — SODIUM CHLORIDE 0.9 % (FLUSH) 0.9 %
10 SYRINGE (ML) INJECTION EVERY 12 HOURS SCHEDULED
Status: DISCONTINUED | OUTPATIENT
Start: 2020-06-10 | End: 2020-06-11 | Stop reason: HOSPADM

## 2020-06-10 RX ORDER — SODIUM CHLORIDE 0.9 % (FLUSH) 0.9 %
10 SYRINGE (ML) INJECTION PRN
Status: DISCONTINUED | OUTPATIENT
Start: 2020-06-10 | End: 2020-06-11 | Stop reason: HOSPADM

## 2020-06-10 RX ORDER — ONDANSETRON 2 MG/ML
4 INJECTION INTRAMUSCULAR; INTRAVENOUS ONCE
Status: COMPLETED | OUTPATIENT
Start: 2020-06-10 | End: 2020-06-10

## 2020-06-10 RX ORDER — SODIUM CHLORIDE 0.9 % (FLUSH) 0.9 %
10 SYRINGE (ML) INJECTION PRN
Status: DISCONTINUED | OUTPATIENT
Start: 2020-06-10 | End: 2020-06-10 | Stop reason: SDUPTHER

## 2020-06-10 RX ORDER — 0.9 % SODIUM CHLORIDE 0.9 %
1000 INTRAVENOUS SOLUTION INTRAVENOUS ONCE
Status: COMPLETED | OUTPATIENT
Start: 2020-06-10 | End: 2020-06-10

## 2020-06-10 RX ORDER — ONDANSETRON 2 MG/ML
4 INJECTION INTRAMUSCULAR; INTRAVENOUS EVERY 6 HOURS PRN
Status: DISCONTINUED | OUTPATIENT
Start: 2020-06-10 | End: 2020-06-11 | Stop reason: HOSPADM

## 2020-06-10 RX ORDER — SODIUM CHLORIDE 9 MG/ML
INJECTION, SOLUTION INTRAVENOUS CONTINUOUS
Status: DISCONTINUED | OUTPATIENT
Start: 2020-06-10 | End: 2020-06-11 | Stop reason: HOSPADM

## 2020-06-10 RX ORDER — PANTOPRAZOLE SODIUM 40 MG/1
40 TABLET, DELAYED RELEASE ORAL
Status: DISCONTINUED | OUTPATIENT
Start: 2020-06-11 | End: 2020-06-11 | Stop reason: HOSPADM

## 2020-06-10 RX ORDER — PROMETHAZINE HYDROCHLORIDE 12.5 MG/1
12.5 TABLET ORAL EVERY 6 HOURS PRN
Status: DISCONTINUED | OUTPATIENT
Start: 2020-06-10 | End: 2020-06-11 | Stop reason: HOSPADM

## 2020-06-10 RX ORDER — LISINOPRIL 5 MG/1
5 TABLET ORAL DAILY
Status: DISCONTINUED | OUTPATIENT
Start: 2020-06-11 | End: 2020-06-11 | Stop reason: HOSPADM

## 2020-06-10 RX ORDER — RANOLAZINE 500 MG/1
1000 TABLET, EXTENDED RELEASE ORAL 2 TIMES DAILY
Status: DISCONTINUED | OUTPATIENT
Start: 2020-06-10 | End: 2020-06-11 | Stop reason: HOSPADM

## 2020-06-10 RX ORDER — ASPIRIN 81 MG/1
81 TABLET, CHEWABLE ORAL DAILY
Status: DISCONTINUED | OUTPATIENT
Start: 2020-06-11 | End: 2020-06-11 | Stop reason: HOSPADM

## 2020-06-10 RX ORDER — ALBUTEROL SULFATE 90 UG/1
2 AEROSOL, METERED RESPIRATORY (INHALATION) ONCE
Status: COMPLETED | OUTPATIENT
Start: 2020-06-10 | End: 2020-06-10

## 2020-06-10 RX ORDER — SODIUM CHLORIDE 0.9 % (FLUSH) 0.9 %
10 SYRINGE (ML) INJECTION EVERY 12 HOURS SCHEDULED
Status: DISCONTINUED | OUTPATIENT
Start: 2020-06-10 | End: 2020-06-10 | Stop reason: SDUPTHER

## 2020-06-10 RX ORDER — LANOLIN ALCOHOL/MO/W.PET/CERES
500 CREAM (GRAM) TOPICAL DAILY
Status: DISCONTINUED | OUTPATIENT
Start: 2020-06-11 | End: 2020-06-11 | Stop reason: HOSPADM

## 2020-06-10 RX ORDER — ACETAMINOPHEN 325 MG/1
650 TABLET ORAL EVERY 6 HOURS PRN
Status: DISCONTINUED | OUTPATIENT
Start: 2020-06-10 | End: 2020-06-11 | Stop reason: HOSPADM

## 2020-06-10 RX ORDER — ACETAMINOPHEN 650 MG/1
650 SUPPOSITORY RECTAL EVERY 6 HOURS PRN
Status: DISCONTINUED | OUTPATIENT
Start: 2020-06-10 | End: 2020-06-11 | Stop reason: HOSPADM

## 2020-06-10 RX ORDER — CLOPIDOGREL BISULFATE 75 MG/1
75 TABLET ORAL DAILY
Status: DISCONTINUED | OUTPATIENT
Start: 2020-06-11 | End: 2020-06-11 | Stop reason: HOSPADM

## 2020-06-10 RX ORDER — POLYETHYLENE GLYCOL 3350 17 G/17G
17 POWDER, FOR SOLUTION ORAL DAILY PRN
Status: DISCONTINUED | OUTPATIENT
Start: 2020-06-10 | End: 2020-06-11 | Stop reason: HOSPADM

## 2020-06-10 RX ORDER — ALBUTEROL SULFATE 90 UG/1
2 AEROSOL, METERED RESPIRATORY (INHALATION) EVERY 6 HOURS PRN
Status: DISCONTINUED | OUTPATIENT
Start: 2020-06-10 | End: 2020-06-11 | Stop reason: HOSPADM

## 2020-06-10 RX ADMIN — METOPROLOL TARTRATE 12.5 MG: 25 TABLET, FILM COATED ORAL at 20:55

## 2020-06-10 RX ADMIN — SODIUM CHLORIDE 1000 ML: 9 INJECTION, SOLUTION INTRAVENOUS at 13:25

## 2020-06-10 RX ADMIN — ALBUTEROL SULFATE 2 PUFF: 90 AEROSOL, METERED RESPIRATORY (INHALATION) at 15:14

## 2020-06-10 RX ADMIN — SODIUM CHLORIDE, PRESERVATIVE FREE 10 ML: 5 INJECTION INTRAVENOUS at 20:55

## 2020-06-10 RX ADMIN — ONDANSETRON 4 MG: 2 INJECTION INTRAMUSCULAR; INTRAVENOUS at 13:25

## 2020-06-10 RX ADMIN — Medication 2 PUFF: at 15:15

## 2020-06-10 RX ADMIN — SODIUM CHLORIDE: 9 INJECTION, SOLUTION INTRAVENOUS at 20:54

## 2020-06-10 RX ADMIN — RANOLAZINE 1000 MG: 500 TABLET, FILM COATED, EXTENDED RELEASE ORAL at 20:54

## 2020-06-10 NOTE — ED NOTES
Patient unsure of dosing of medications, no claims available for review. Patient receives medications from the [facility]. Faxed for DANYEL, waiting for response.     Respectfully,  Jack Wall CPhT

## 2020-06-10 NOTE — H&P
History and Physical  Internal Medicine Hospitalist      Name:  Channing Zhong /Age/Sex: 1950  (78 y.o. male)   MRN & CSN:  [id number] & [id number] Admission Date/Time: 6/10/2020  1:01 PM   Location:  ED36/ED-36 PCP: Katherine Crowell MD       Hospital Day: 1          Chief Complaint: Altered Mental Status (SPD called EMS , pt found in car asleep at store where wife was shopping, slow to respond)     Assessment and Plan:      --- Toxic Encephalopathy - possibly due to carbonmonoxide poisoning based on history. Symptoms resolving by the time of assessment in the ED (AO x3)  CT brain unremarkable   Neurological exam unremarkable  CMP unremarkable except for mild hyponatremia of 133. Troponin negative. No chest pain no hypoglycemia. Serum ammonia within normal limits. Blood ethanol level normal.  Salicylate and tylenol level not elevated. Urine drug screen pending. Blood cultures pending. Sepsis lactate not elevated. No fever. Vital signs stable. Plan  Admit for observation overnight. Telemetry monitoring. Check TSH   Orthostatic vital signs. Get urinalysis and urine drug screen. Follow-up carboxyhemoglobin level. Can consider discharging him tomorrow if he is doing much better. Other diagnoses  CAD s/p PCI -aspirin, Ranexa, beta-blocker Plavix. COPD -MDI  Hypertension -lisinopril. Current diagnosis and plan of management discussed with the patient at the time of admission in lay language who agree to the above plan and disposition of admission for further care. All concerns and questions addressed.          Diet  General diet   DVT Prophylaxis [x] Lovenox, []  Heparin, [] SCDs, [] Ambulation  [] Long term AC   GI Prophylaxis [x] PPI,  [] H2 Blocker,  [] Carafate,  [] Diet,  [] No GI prophylaxis, N/A: patient is not under significant medical stress, non-ICU or is receiving a diet/tube feeds   Code Status  full code   Disposition  Home   MDM [] Low, [x] Moderate,[]  High     History of Present Illness:     Principal Problem: Altered mental status    Gordon Garcia is a 78 y.o. male who presents to the ED with above complaint. Patient has PMH of CAD s/p PCI to RCA and PDA, COPD. Patient is unable to recall much of the history. Per discussion with the ED attending, patient was reportedly waiting in the car was on a running when he started to feel really hot nauseous and had one episode of nonbloody nonbilious emesis and had a feeling like he was in a pass out. Wife later found him asleep in the car and he was very slow to respond so and was called and EMS was dispatched. At the time patient arrived in the ED he reportedly was still feeling lightheaded and warm. On time of my assessment patient was fully awake and alert oriented to person place and time. Reported that he thought episode today could be from low blood sugar because he had not eaten anything all day but blood work on arrival did not show hypoglycemia. He denied any recreational drug use except for marijuana which he smoked early in the morning. Denies headache, fever, confusion. No chest pain. Work-up in the ED was largely unremarkable except for mild hyponatremia. He received fluid resuscitation in the ED. ED Course: Discussed case with ED physician prior to admission. ROS: As per HPI, otherwise 10-systems reviewed negative, unless as noted above. Objective:   No intake or output data in the 24 hours ending 06/10/20 1639     Vitals:   Vitals:    06/10/20 1330 06/10/20 1400 06/10/20 1430 06/10/20 1500   BP:   93/60 110/70   Pulse: 56  61 55   Resp: 23  19 15   Temp:       SpO2: 95% 98% 98% 97%   Weight:       Height:         Physical Exam: 06/10/20    GEN  - male sitting upright in bed. EYES -Pupils are equally round. No vision changes. No scleral erythema, discharge, or conjunctivitis. HENT -MM are moist. Oral pharynx without exudates, no evidence of thrush.   NECK -Supple, no apparent thyromegaly or masses. RESP -breathing comfortably on room air. C/V  -S1/S2 auscultated. RRR without appreciable M/R/G. No peripheral edema. No reproducible chest wall tenderness. GI  -Abdomen is soft non-distended, no significant tenderness. No masses or guarding. + BS in all quadrants. Rectal exam deferred.   -No CVA tenderness. Castellon catheter is not present. MS  -B/L extremities strong muscles strength. Full movements. No gross joint deformities. No swelling, intact sensation symmetrical.   SKIN  -Normal coloration, warm, dry. No open wounds or ulcers. NEURO  -CN 2-12 appear grossly intact, normal speech, no lateralizing weakness. PSYC  -Awake, alert, oriented x 3. Appropriate affect. Past Medical History:      Past Medical History:   Diagnosis Date    CAD (coronary artery disease)     [facility]     [facility] attack [facility]     Spinal stenosis      Past Surgery History:  Patient  has a past surgical history that includes back surgery; Colon surgery; and Coronary angioplasty with stent. Social History:    FAM HX: Assessed: family history includes COPD in his mother; Cancer in his father and mother; Heart Disease in his father; High Blood Pressure in his father; Kidney Disease in his father; Stroke in his father.   Soc HX:   Social History     Socioeconomic History    Marital status:      Spouse name: None    Number of children: None    Years of education: None    Highest education level: None   Occupational History    None   Social Needs    Financial resource strain: None    Food insecurity     Worry: None     Inability: None    Transportation needs     Medical: None     Non-medical: None   Tobacco Use    Smoking status: Current Every Day Smoker     Packs/day: 0.50     Years: 50.00     Pack years: 25.00     Types: Cigarettes    Smokeless tobacco: Never Used   Substance and Sexual Activity    Alcohol use: Yes     Comment: socially    Drug use: SYSTEM PROVIDED HISTORY: AMS TECHNOLOGIST PROVIDED HISTORY: Has a \"code stroke\" or \"stroke alert\" been called? ->No Reason for exam:->AMS Reason for Exam: AMS Acuity: Acute Type of Exam: Initial FINDINGS: BRAIN/VENTRICLES: There is mild cerebral atrophy seen to be. There is no intra-axial or extra-axial bleed. There is some increased density seen along the superior aspect of the posterior horn of the left lateral ventricle similar in appearance to the prior CT scan likely representing underlying calcifications. There is no evidence for mass or mass effect. ORBITS: The visualized portion of the orbits demonstrate no acute abnormality. SINUSES: The visualized paranasal sinuses and mastoid air cells demonstrate no acute abnormality. SOFT TISSUES/SKULL:  No acute abnormality of the visualized skull or soft tissues. No acute intracranial abnormality. Xr Chest Portable    Redemonstration of cardiomegaly, congestion and mild COPD. No acute abnormality.      EKG this visit:   EKG: NSR    Current Treatment Team:  Treatment Team: Attending Provider: Steffany Rosales MD; Registered Nurse: Gadiel Astorga RN; Physician Assistant: SAYA Mendoza Md, MS  [facility]  6/10/2020 4:39 PM      Electronically signed by Logan Bhakta MD on 6/10/2020 at 4:39 PM

## 2020-06-10 NOTE — ED TRIAGE NOTES
Pt presents to Ed via EMS with c/o altered mental status. Pt states that he smoked weed this morning around 1100. SPD called EMS because pt was asleep in car in front of store. Pt states wife was in store shopping. Pupils responsive to 5. IV in place.  02 at 90% on room air. 02 applies a 2Lpm via NC.

## 2020-06-10 NOTE — ED PROVIDER NOTES
%    Monocytes % 5.1 (H) 0 - 4 %    Eosinophils % 2.5 0 - 3 %    Basophils % 0.1 0 - 1 %    Segs Absolute 6.2 K/CU MM    Lymphocytes Absolute 1.7 K/CU MM    Monocytes Absolute 0.4 K/CU MM    Eosinophils Absolute 0.2 K/CU MM    Basophils Absolute 0.0 K/CU MM    Nucleated RBC % 0.0 %    Total Nucleated RBC 0.0 K/CU MM    Total Immature Neutrophil 0.03 K/CU MM    Immature Neutrophil % 0.4 0 - 0.43 %   Comprehensive Metabolic Panel w/ Reflex to MG   Result Value Ref Range    Sodium 133 (L) 135 - 145 MMOL/L    Potassium 3.7 3.5 - 5.1 MMOL/L    Chloride 100 99 - 110 mMol/L    CO2 22 21 - 32 MMOL/L    BUN 13 6 - 23 MG/DL    CREATININE 0.8 (L) 0.9 - 1.3 MG/DL    Glucose 129 (H) 70 - 99 MG/DL    Calcium 8.9 8.3 - 10.6 MG/DL    Alb 3.8 3.4 - 5.0 GM/DL    Total Protein 6.9 6.4 - 8.2 GM/DL    Total Bilirubin 0.3 0.0 - 1.0 MG/DL    ALT 15 10 - 40 U/L    AST 12 (L) 15 - 37 IU/L    Alkaline Phosphatase 57 40 - 129 IU/L    GFR Non-African American >60 >60 mL/min/1.73m2    GFR African American >60 >60 mL/min/1.73m2    Anion Gap 11 4 - 16   Troponin   Result Value Ref Range    Troponin T <0.010 <0.01 NG/ML   Lipase   Result Value Ref Range    Lipase 15 13 - 60 IU/L   Ammonia Level   Result Value Ref Range    Ammonia 35 16 - 60 UMOL/L   Salicylate   Result Value Ref Range    Salicylate Lvl <6.2 (L) 15 - 30 MG/DL    DOSE AMOUNT DOSE AMT. GIVEN - Unknown     DOSE TIME DOSE TIME GIVEN - Unknown    Acetaminophen Level   Result Value Ref Range    Acetaminophen Level <5.0 (L) 15 - 30 ug/ml    DOSE AMOUNT DOSE AMT.  GIVEN - Unknown     DOSE TIME DOSE TIME GIVEN - Unknown    Ethanol   Result Value Ref Range    Alcohol Scrn <0.01 <0.01 %WT/VOL   Blood Gas, Venous   Result Value Ref Range    pH, Adalberto 7.34 7.32 - 7.42    pCO2, Adalberto 44 38 - 52 mmHG    pO2, Adalberto 88 (H) 28 - 48 mmHG    Base Excess 2 0 - 3.3    HCO3, Venous 23.7 19 - 25 MMOL/L    O2 Sat, Adalberto 88.1 (H) 50 - 70 %    Comment VBG    Lactate, Sepsis   Result Value Ref Range    Lactic Acid, left arm pain and therefore EKG was repeated-ED physician's note for interpretation. Patient treated with aspirin given his diaphoresis, nausea, vomiting, left arm pain. Patient describes near syncopal episode prior to arrival.  Will admit patient for further evaluation and care given his near syncope, diaphoresis, nausea, vomiting, left arm pain, and initial altered mental status. During his ED course he has become more alert and oriented. He is no longer drowsy appearing. Patient was treated with albuterol and Atrovent inhaler, IV Zofran, and IV fluids with resolution of wheezing, resolution of nausea, and no further episodes of emesis. After treatments with inhaler and patient becoming more alert and less drowsy-patient no longer requiring oxygen. I consulted with hospitalist and we discussed the patient's case. Hospitalist agrees to admit the patient at this time for further evaluation and care. All pertinent Lab data and radiographic results reviewed with patient at bedside. The patient and/or the family were informed of the results of any tests/labs/imaging, the treatment plan, and time was allotted to answer questions. Diagnosis, disposition, and treatment plan reviewed in detail with patient who understands and agrees to admission at this time. See chart for details of medications given during the ED stay. Clinical  IMPRESSION    1. Altered mental status, unspecified altered mental status type    2. Near syncope    3. Non-intractable vomiting with nausea, unspecified vomiting type    4. Hypoxia      CRITICAL CARE NOTE:  There was a high probability of clinically significant life-threatening deterioration of the patient's condition requiring my urgent intervention due to hypoxia, altered mental status. Supplemental oxygen via nasal cannula, neuro check and rechecks was performed to address this. Total critical care time is at least  34 minutes.     This includes vital sign monitoring,

## 2020-06-10 NOTE — PROGRESS NOTES
list received from the [facility]. Patient reports he has taken first doses of medications today.     To my knowledge the above medication history is accurate as of 6/10/2020 5:53 PM.   Vazquez Coppola CPhT   6/10/2020 5:53 PM

## 2020-06-10 NOTE — ED PROVIDER NOTES
I independently examined and evaluated Alia Haro. In brief their history revealed near syncope. Started on hour PTA. Patients states he was in the car when he started to feel hot, sweaty, nausea. He states like he was going to pass out. WIfe found him in car, very sleepy, She says he was slow to respind, wife called 911. He states he feels fatigued, nausea, lightheaded. SOme mild SOB. No cough, no headache. States he didn't drink or eat much today. Does admit to smoking marijuana , his usual amount from his same dealer. He has heart stents. . Their exam revealed drowsy. Answers slowly. Mild wheezing throughout, moves all extremities, seems drowsy. Sensation intact all extremities. . All diagnostic, treatment, and disposition decisions were made by myself in conjunction with the mid-level provider. Before disposition, questions were sought and answered with the patient. They have voiced understanding and agree with the plan: normotensive, normal rate, afebrile, sats low, placed on oxygen. EKG shows no new changes. .CBC shows normal white count, normal hemoglobin level. CMP normal.. Lipase normal. Ammonia level normal. Salicylate normal. Tylenol normal. Ethanol normal. UDS and UA pending. Lactic acid is normal at 1.2. accuchek normal. BNP normal. Troponin normal. VBG shows normal ph and normal co2. CT head is normal. CXR shows cardiomegaly, congestion, mild COPD. Patient's labs do not show any acute findings. CT head is normal.  Patient is still slow to respond to speech and very drowsy. Do feel he needs to be admitted for observation secondary to him not being at baseline. For all further details of the patient's emergency department visit, please see the mid-level practitioner's documentation. The Ekg interpreted by me shows  normal sinus rhythm with a rate of 61  Axis is   Normal  QTc is  normal  Intervals and Durations are unremarkable.       ST Segments: no acute change  No significant change

## 2020-06-11 VITALS
WEIGHT: 184.8 LBS | BODY MASS INDEX: 27.37 KG/M2 | HEART RATE: 82 BPM | RESPIRATION RATE: 16 BRPM | OXYGEN SATURATION: 96 % | SYSTOLIC BLOOD PRESSURE: 107 MMHG | TEMPERATURE: 98.5 F | DIASTOLIC BLOOD PRESSURE: 62 MMHG | HEIGHT: 69 IN

## 2020-06-11 LAB
ANION GAP SERPL CALCULATED.3IONS-SCNC: 7 MMOL/L (ref 4–16)
BUN BLDV-MCNC: 11 MG/DL (ref 6–23)
CALCIUM SERPL-MCNC: 9.1 MG/DL (ref 8.3–10.6)
CHLORIDE BLD-SCNC: 106 MMOL/L (ref 99–110)
CO2: 24 MMOL/L (ref 21–32)
CREAT SERPL-MCNC: 0.8 MG/DL (ref 0.9–1.3)
EKG ATRIAL RATE: 64 BPM
EKG DIAGNOSIS: NORMAL
EKG P AXIS: 48 DEGREES
EKG P-R INTERVAL: 200 MS
EKG Q-T INTERVAL: 432 MS
EKG QRS DURATION: 86 MS
EKG QTC CALCULATION (BAZETT): 445 MS
EKG R AXIS: 14 DEGREES
EKG T AXIS: 42 DEGREES
EKG VENTRICULAR RATE: 64 BPM
GFR AFRICAN AMERICAN: >60 ML/MIN/1.73M2
GFR NON-AFRICAN AMERICAN: >60 ML/MIN/1.73M2
GLUCOSE BLD-MCNC: 89 MG/DL (ref 70–99)
POTASSIUM SERPL-SCNC: 4.4 MMOL/L (ref 3.5–5.1)
SODIUM BLD-SCNC: 137 MMOL/L (ref 135–145)

## 2020-06-11 PROCEDURE — 93010 ELECTROCARDIOGRAM REPORT: CPT | Performed by: INTERNAL MEDICINE

## 2020-06-11 PROCEDURE — G0378 HOSPITAL OBSERVATION PER HR: HCPCS

## 2020-06-11 PROCEDURE — 80048 BASIC METABOLIC PNL TOTAL CA: CPT

## 2020-06-11 PROCEDURE — 36415 COLL VENOUS BLD VENIPUNCTURE: CPT

## 2020-06-11 PROCEDURE — 6370000000 HC RX 637 (ALT 250 FOR IP): Performed by: INTERNAL MEDICINE

## 2020-06-11 RX ORDER — ASPIRIN 81 MG/1
81 TABLET, CHEWABLE ORAL DAILY
Qty: 30 TABLET | Refills: 3 | Status: SHIPPED | OUTPATIENT
Start: 2020-06-11

## 2020-06-11 RX ADMIN — CLOPIDOGREL BISULFATE 75 MG: 75 TABLET ORAL at 10:36

## 2020-06-11 RX ADMIN — PANTOPRAZOLE SODIUM 40 MG: 40 TABLET, DELAYED RELEASE ORAL at 05:38

## 2020-06-11 RX ADMIN — ASPIRIN 81 MG 81 MG: 81 TABLET ORAL at 10:34

## 2020-06-11 RX ADMIN — RANOLAZINE 1000 MG: 500 TABLET, FILM COATED, EXTENDED RELEASE ORAL at 10:33

## 2020-06-11 RX ADMIN — METOPROLOL TARTRATE 12.5 MG: 25 TABLET, FILM COATED ORAL at 10:34

## 2020-06-11 RX ADMIN — CYANOCOBALAMIN TAB 1000 MCG 500 MCG: 1000 TAB at 10:33

## 2020-06-11 NOTE — DISCHARGE SUMMARY
release capsule  Take 20 mg by mouth daily             ranolazine (RANEXA) 1000 MG extended release tablet  Take 1,000 mg by mouth 2 times daily                   Diet:  DIET GENERAL;     Activity:   activity as tolerated     Discharge Condition:   good    Disposition:   home    Follow-up    PCP as needed    Discharge Physician Signed: Julianne Rudolph

## 2020-06-11 NOTE — PLAN OF CARE
Problem: Falls - Risk of:  Goal: Will remain free from falls  Description: Will remain free from falls  Outcome: Ongoing  Goal: Absence of physical injury  Description: Absence of physical injury  Outcome: Ongoing     Problem: Safety:  Goal: Free from accidental physical injury  Description: Free from accidental physical injury  Outcome: Ongoing     Problem: Daily Care:  Goal: Daily care needs are met  Description: Daily care needs are met  Outcome: Ongoing     Problem: Discharge Planning:  Goal: Patients continuum of care needs are met  Description: Patients continuum of care needs are met  Outcome: Ongoing

## 2020-06-16 LAB
CULTURE: NORMAL
CULTURE: NORMAL
Lab: NORMAL
Lab: NORMAL
SPECIMEN: NORMAL
SPECIMEN: NORMAL

## 2020-08-30 ENCOUNTER — TELEPHONE (OUTPATIENT)
Dept: PHARMACY | Facility: CLINIC | Age: 70
End: 2020-08-30

## 2020-08-30 NOTE — TELEPHONE ENCOUNTER
Adrianna Navarrete MD - would patient benefit from statin therapy?  - Patient with history of CAD with stent  - No statin in med list  - consider moderate-high intensity statin     Thank you,  Tj Baldwin, PharmD, [facility] Pharmacist  Direct: [phone], Ext [phone]  ==============================================================  CLINICAL PHARMACY: STATIN REVIEW    SUBJECTIVE:   Identified as DM care gap for [place]: statin therapy. OBJECTIVE:  Allergies   Allergen Reactions    Ketorolac Tromethamine Hives    Toradol [Ketorolac Tromethamine]        Medications per current medication list:  Current Outpatient Medications   Medication Sig Dispense Refill    aspirin 81 MG chewable tablet Take 1 tablet by mouth daily 30 tablet 3    omeprazole (PRILOSEC) 20 MG delayed release capsule Take 20 mg by mouth daily      clopidogrel (PLAVIX) 75 MG tablet Take 75 mg by mouth daily      cyanocobalamin 500 MCG tablet Take 500 mcg by mouth daily      METOPROLOL TARTRATE PO Take 12.5 mg by mouth 2 times daily      ranolazine (RANEXA) 1000 MG extended release tablet Take 1,000 mg by mouth 2 times daily      lisinopril (PRINIVIL;ZESTRIL) 5 MG tablet Take 5 mg by mouth daily      nitroGLYCERIN (NITROSTAT) 0.4 MG SL tablet Place 0.4 mg under the tongue every 5 minutes as needed.  albuterol (PROVENTIL;VENTOLIN) 90 MCG/ACT inhaler Inhale 2 puffs into the lungs every 6 hours as needed. No current facility-administered medications for this visit.         Labs:  Lab Results   Component Value Date    CHOL 176 02/21/2013    CHOL 180 02/21/2013     Lab Results   Component Value Date    TRIG 122 02/21/2013    TRIG 102 02/21/2013     Lab Results   Component Value Date    HDL 39 (L) 02/21/2013    HDL 40 (L) 02/21/2013     No results found for: LDLCALC, LDLCHOLESTEROL  No results found for: LABVLDL, VLDL  No results found for: [place]  Lab Results   Component Value Date    ALT 15

## 2020-08-31 RX ORDER — ATORVASTATIN CALCIUM 80 MG/1
80 TABLET, FILM COATED ORAL DAILY
COMMUNITY

## 2020-08-31 NOTE — TELEPHONE ENCOUNTER
Alpesh Mccoy  - patient states he thought lipitor had been stopped by some doctor  - is agreeable to lipitor now, please send to his [address]    Thank you! Tobi Melissa PharmD, [facility] Pharmacist  Direct: [phone], Ext [phone]  ======================================  Reached patient for review. He states he was taken off cholesterol medicine bc his cholesterol was low. He states it was a [facility] who stopped the atorvastatin but he did not know the doctor's name. Educated patient with a stent usually you stay on statin. Patient states, well I was just following what the doctors told me to do. Patient now sees Phoebe Nieto MD at the [place], however lipitor no longer even on [place] med list.     Patient states he is agreeable to medication.      Tobi Melissa PharmD, [facility] Pharmacist  Direct: [phone]  [phone], Ext [phone]  ==================================  CLINICAL PHARMACY CONSULT: MED RECONCILIATION/REVIEW ADDENDUM    For Pharmacy Admin Tracking Only    PHSO: Yes  Total # of Interventions Recommended: 1  - New Order #: 1 New Medication Order Reason(s): Needs Additional Medication Therapy  Recommended intervention potential cost savings: 1  Total Interventions Accepted: 1  Time Spent (min): [address]

## 2020-08-31 NOTE — TELEPHONE ENCOUNTER
Patient had returned my call and LM. Attempted to reach patient and had to leave a message.      Joey Recinos PharmD, [facility] Pharmacist  Direct: [phone], Ext [phone]

## 2020-08-31 NOTE — TELEPHONE ENCOUNTER
Berna Koehler  - Patient returned call and stated he found some atorvastatin in his home- atorvastatin 80 mg that [facility] doctor once had him on.   - Patient states he will re-start taking this and get refills from his [facility] doctor, Dolly Palomino MD      Thank you!   Aj Shahid, PharmD, [facility] Pharmacist  Direct: [phone]  [phone], Ext [phone]

## 2021-10-14 ENCOUNTER — HOSPITAL ENCOUNTER (EMERGENCY)
Age: 71
Discharge: LEFT BEFORE DISCHARGE | End: 2021-10-14
Payer: MEDICARE

## 2021-10-14 ASSESSMENT — PAIN DESCRIPTION - ORIENTATION: ORIENTATION: LEFT

## 2021-10-14 ASSESSMENT — PAIN SCALES - GENERAL: PAINLEVEL_OUTOF10: 3

## 2021-10-14 ASSESSMENT — PAIN DESCRIPTION - LOCATION: LOCATION: HEAD

## 2021-10-14 ASSESSMENT — PAIN DESCRIPTION - PAIN TYPE: TYPE: ACUTE PAIN

## 2021-10-15 NOTE — ED TRIAGE NOTES
Pt from home accidentally shut [place] of truck on head. Denies symptoms or LOC. [place] upper forehead. approx 1in bleeding controlled.

## 2022-04-01 ENCOUNTER — APPOINTMENT (OUTPATIENT)
Dept: GENERAL RADIOLOGY | Age: 72
End: 2022-04-01
Payer: MEDICARE

## 2022-04-01 ENCOUNTER — HOSPITAL ENCOUNTER (EMERGENCY)
Age: 72
Discharge: HOME | End: 2022-04-01
Payer: MEDICARE

## 2022-04-01 VITALS
DIASTOLIC BLOOD PRESSURE: 92 MMHG | HEART RATE: 91 BPM | TEMPERATURE: 98.9 F | SYSTOLIC BLOOD PRESSURE: 160 MMHG | OXYGEN SATURATION: 96 % | RESPIRATION RATE: 16 BRPM

## 2022-04-01 DIAGNOSIS — M25.572 ACUTE LEFT ANKLE PAIN: Primary | ICD-10-CM

## 2022-04-01 DIAGNOSIS — M79.672 ACUTE FOOT PAIN, LEFT: ICD-10-CM

## 2022-04-01 PROCEDURE — 99285 EMERGENCY DEPT VISIT HI MDM: CPT

## 2022-04-01 PROCEDURE — 6370000000 HC RX 637 (ALT 250 FOR IP): Performed by: PHYSICIAN ASSISTANT

## 2022-04-01 PROCEDURE — 73610 X-RAY EXAM OF ANKLE: CPT

## 2022-04-01 PROCEDURE — 73630 X-RAY EXAM OF FOOT: CPT

## 2022-04-01 RX ORDER — IBUPROFEN 600 MG/1
600 TABLET ORAL ONCE
Status: COMPLETED | OUTPATIENT
Start: 2022-04-01 | End: 2022-04-01

## 2022-04-01 RX ADMIN — IBUPROFEN 600 MG: 600 TABLET ORAL at 07:01

## 2022-04-01 ASSESSMENT — PAIN SCALES - GENERAL
PAINLEVEL_OUTOF10: 9
PAINLEVEL_OUTOF10: 9

## 2022-04-01 ASSESSMENT — PAIN - FUNCTIONAL ASSESSMENT: PAIN_FUNCTIONAL_ASSESSMENT: 0-10

## 2022-04-01 ASSESSMENT — PAIN DESCRIPTION - DESCRIPTORS: DESCRIPTORS: ACHING

## 2022-04-01 ASSESSMENT — PAIN DESCRIPTION - ORIENTATION: ORIENTATION: LEFT

## 2022-04-01 NOTE — ED PROVIDER NOTES
[place]        PCP: Guerline Garcia MD    CHIEF COMPLAINT    Chief Complaint   Patient presents with    Ankle Pain     left ankle- Since yesterday AM. Denise Lynn a \"pop\" when he was stretching        This patient was not evaluated by the attending physician. I have independently evaluated this patient. My supervising physician was available for consultation. HPI    Sandy Espinosa is a 73 y.o. male who presents with pain localized in the Left ankle. Onset was yesterday morning. The context is patient reports he woke up yesterday morning and went to stretch pointing his toes of both feet when he heard a \"pop \"in his left ankle and has been having pain since in his left ankle. Duration of pain has been constant since the onset. Characteristic of pain is described as sharp. The pain severity is 9/10. The patient denies any direct injury or trauma. The pain is aggravated by ambulation, range of motion, and direct palpation. Patient admits to alleviating factor of resting. He has not tried medication for symptoms. Patient reports difficulty ambulating due to pain. Patient reports pain radiates slightly toward the ball of his left foot. Patient denies numbness, weakness, tingling.       REVIEW OF SYSTEMS    General: No fever or chills  Skin: No lacerations or puncture wounds  Musculoskeletal: See HPI; No other joint pain    All other review of systems are negative  See HPI and nursing notes for additional information     PAST MEDICAL & SURGICAL HISTORY    Past Medical History:   Diagnosis Date    CAD (coronary artery disease)     [facility]     Heart attack ([facility]     Spinal stenosis      Past Surgical History:   Procedure Laterality Date    BACK SURGERY      COLON SURGERY      CORONARY ANGIOPLASTY WITH STENT PLACEMENT      PCI RCA in 2009 and R PDA in 2015, + one more stent at a separate occasion (updated 10/2019) - all 3 done at the [facility] CURRENT MEDICATIONS    Current Outpatient Rx   Medication Sig Dispense Refill    atorvastatin (LIPITOR) 80 MG tablet Take 80 mg by mouth daily      aspirin 81 MG chewable tablet Take 1 tablet by mouth daily 30 tablet 3    omeprazole (PRILOSEC) 20 MG delayed release capsule Take 20 mg by mouth daily      clopidogrel (PLAVIX) 75 MG tablet Take 75 mg by mouth daily      cyanocobalamin 500 MCG tablet Take 500 mcg by mouth daily      METOPROLOL TARTRATE PO Take 12.5 mg by mouth 2 times daily      ranolazine (RANEXA) 1000 MG extended release tablet Take 1,000 mg by mouth 2 times daily      lisinopril (PRINIVIL;ZESTRIL) 5 MG tablet Take 5 mg by mouth daily      nitroGLYCERIN (NITROSTAT) 0.4 MG SL tablet Place 0.4 mg under the tongue every 5 minutes as needed.  albuterol (PROVENTIL;VENTOLIN) 90 MCG/ACT inhaler Inhale 2 puffs into the lungs every 6 hours as needed.          ALLERGIES    Allergies   Allergen Reactions    Ketorolac Tromethamine Hives    Toradol [Ketorolac Tromethamine]        SOCIAL & FAMILY HISTORY    Social History     Socioeconomic History    Marital status:      Spouse name: Not on file    Number of children: Not on file    Years of education: Not on file    Highest education level: Not on file   Occupational History    Not on file   Tobacco Use    Smoking status: Current Every Day Smoker     Packs/day: 1.00     Years: 50.00     Pack years: 50.00     Types: Cigarettes    Smokeless tobacco: Never Used   Vaping Use    Vaping Use: Some days    Substances: Always   Substance and Sexual Activity    Alcohol use: Yes     Comment: socially    Drug use: Yes     Types: Marijuana Jamil Bilberry)    Sexual activity: Yes   Other Topics Concern    Not on file   Social History Narrative    Not on file     Social Determinants of Health     Financial Resource Strain:     Difficulty of Paying Living Expenses: Not on file   Food Insecurity:     Worried About Running Out of Food in the Last Year: Not on file    Ran Out of Food in the Last Year: Not on file   Transportation Needs:     Lack of Transportation (Medical): Not on file    Lack of Transportation (Non-Medical): Not on file   Physical Activity:     Days of Exercise per Week: Not on file    Minutes of Exercise per Session: Not on file   Stress:     Feeling of Stress : Not on file   Social Connections:     Frequency of Communication with Friends and Family: Not on file    Frequency of Social Gatherings with Friends and Family: Not on file    Attends Qu[employer] Services: Not on file    Active Member of [address] [place] [address] [employer] or Organizations: Not on file    Attends Club or Organization Meetings: Not on file    Marital Status: Not on file   Intimate Partner Violence:     Fear of Current or Ex-Partner: Not on file    Emotionally Abused: Not on file    Physically Abused: Not on file    Sexually Abused: Not on file   Housing Stability:     Unable to Pay for Housing in the Last Year: Not on file    Number of [place] in the Last Year: Not on file    Unstable Housing in the Last Year: Not on file     Family History   Problem Relation Age of Onset    COPD Mother     Cancer Mother     Cancer Father     Heart Disease Father     High Blood Pressure Father     Stroke Father     Kidney Disease Father          PHYSICAL EXAM    VITAL SIGNS: BP (!) 160/92   Pulse 91   Temp 98.9 °F (37.2 °C)   Resp 16   SpO2 96%   Constitutional:  Well developed, appears comfortable  HENT:  Atraumatic  Respiratory:  Nonlabored breathing  Musculoskeletal: The Left ankle/foot demonstrates no swelling. No gross deformity. No discoloration. There is tenderness to palpation over medial malleolus as well as medial dorsal aspect of left foot diffusely without point tenderness. Rest of foot is nontender to palpation. Full active range of motion with ankle extension but decreased ankle flexion, inversion, eversion due to pain. The ankle joint is stable.   Achilles tendon clinically intact. Affected extremity with 5/5 strength and distally neurovascularly intact. No swelling, discoloration, or tenderness to palpation of the ipsilateral proximal to distal lower leg bones, knee, toes. No fibular head tenderness. Vascular:  DP pulse 2+, capillary refill intact. Integument:  No open wounds. Neurologic:  Awake alert, no slurred speech     RADIOLOGY   XR FOOT LEFT (MIN 3 VIEWS)   Final Result   No acute osseous abnormality involving the left ankle or the left foot. XR ANKLE LEFT (MIN 3 VIEWS)   Final Result   No acute osseous abnormality involving the left ankle or the left foot. PROCEDURES   SPLINT PLACEMENT     A walking boot splint was applied to left lower extremity by myself for musculoskeletal pain of left ankle with concern for ankle sprain. The splint is in good position. The patient's extremity is neurovascularly intact before and after the splint placement. ED COURSE & MEDICAL DECISION MAKING       Vital signs and nursing notes reviewed during ED course. I have independently evaluated this patient. Supervising physician present in the Emergency Department, available for consultation, throughout entirety of patient care. All pertinent Lab data and radiographic results reviewed with patient at bedside. History and exam consistent with musculoskeletal pain of left foot and ankle. While in the ED today, left foot and ankle xrays were obtained and revealed no acute osseous abnormality. Patient treated with ibuprofen for pain. Patient received low top walking boot and crutches while in the ED today. Patient is to advance off of crutches to full weightbearing as tolerated. Splint usage/care discussed. I recommend rest, cryotherapy and elevation to patient. Affected extremity is neurovascularly intact on exam today and I have low clinical suspicion for vascular or nerve injury on exam today. Patient is nontoxic appearing.   Vital signs are stable-asymptomatic for elevated blood pressure. Patient is stable for outpatient management. The patient and/or the family were informed of the results of any tests/labs/imaging, the treatment plan, and time was allotted to answer questions. Diagnosis and plan discussed in detail with patient who understands and agrees. Patient is comfortable with discharge at this time. Patient understands and agrees to follow up with PCP in 2-3 days for recheck, and follow up with orthopedist in 7-14 days if symptoms do not improve in that time. Patient understands and agrees to return to emergency department if symptoms worsen or any new symptoms develop. Clinical  IMPRESSION    1. Acute left ankle pain    2. Acute foot pain, left         Disposition referral (if applicable):  Red Silva MD  [address] [place] [address]  [phone]    Call today  Recheck in 2-3 days    Yo Iyer, [address] [facility] [address] [facility]  [phone]    Call   Recheck in 1-2 weeks, As needed    [facility] Emergency Department  [place] [id number]  [phone]  Go to   Return to ED if symptoms worsen or new symptoms      Disposition medications (if applicable):  New Prescriptions    No medications on file       Comment: Please note this report has been produced using speech recognition software and may contain errors related to that system including errors in grammar, punctuation, and spelling, as well as words and phrases that may be inappropriate. If there are any questions or concerns please feel free to contact the dictating provider for clarification.           Chico Greenfield PA-C  04/01/22 0561

## 2022-04-01 NOTE — ED TRIAGE NOTES
Pt arrived via ems with co left ankle pain. Pt states he was getting out of bed yesterday morning and when he stretched he heard a \"pop\". Pt states the pain is much worse today. Pt states he is unable to put pressure on it. He is able to move his toes and move full ROM.  Pt is ao and rates his pain 910
risk factors

## 2022-04-01 NOTE — ACP (ADVANCE CARE PLANNING)
Patient does not have a current Medical POA.     Hospital will follow [place] Next of Kin hierarchy in the following descending order for priority:    Guardian  Spouse  [de-identified] of adult Children  Parents  [de-identified] of adult Siblings  Nearest Relative not described above     Spouse will be [address] per [address] [facility]

## 2022-04-01 NOTE — CARE COORDINATION
Patient does not have a current Medical POA.     Hospital will follow [place] Next of Kin hierarchy in the following descending order for priority:    Guardian  Spouse  [de-identified] of adult Children  Parents  [de-identified] of adult Siblings  Nearest Relative not described above     Spouse will be [address] per [address] [facility]

## 2022-04-11 ENCOUNTER — APPOINTMENT (OUTPATIENT)
Dept: CT IMAGING | Age: 72
End: 2022-04-11
Payer: MEDICARE

## 2022-04-11 ENCOUNTER — APPOINTMENT (OUTPATIENT)
Dept: GENERAL RADIOLOGY | Age: 72
End: 2022-04-11
Payer: MEDICARE

## 2022-04-11 ENCOUNTER — HOSPITAL ENCOUNTER (OUTPATIENT)
Age: 72
Setting detail: OBSERVATION
Discharge: HOME | End: 2022-04-12
Attending: EMERGENCY MEDICINE | Admitting: INTERNAL MEDICINE
Payer: MEDICARE

## 2022-04-11 DIAGNOSIS — R00.1 BRADYCARDIA: ICD-10-CM

## 2022-04-11 DIAGNOSIS — R06.00 DYSPNEA AND RESPIRATORY ABNORMALITIES: ICD-10-CM

## 2022-04-11 DIAGNOSIS — R06.89 DYSPNEA AND RESPIRATORY ABNORMALITIES: ICD-10-CM

## 2022-04-11 DIAGNOSIS — R07.9 CHEST PAIN, UNSPECIFIED TYPE: Primary | ICD-10-CM

## 2022-04-11 LAB
ALBUMIN SERPL-MCNC: 3.6 GM/DL (ref 3.4–5)
ALCOHOL SCREEN SERUM: <0.01 %WT/VOL
ALP BLD-CCNC: 61 IU/L (ref 40–129)
ALT SERPL-CCNC: 12 U/L (ref 10–40)
AMPHETAMINES: NEGATIVE
ANION GAP SERPL CALCULATED.3IONS-SCNC: 11 MMOL/L (ref 4–16)
APTT: 25.4 SECONDS (ref 25.1–37.1)
AST SERPL-CCNC: 15 IU/L (ref 15–37)
BACTERIA: NEGATIVE /HPF
BARBITURATE SCREEN URINE: NEGATIVE
BASOPHILS ABSOLUTE: 0.1 K/CU MM
BASOPHILS RELATIVE PERCENT: 1.1 % (ref 0–1)
BENZODIAZEPINE SCREEN, URINE: NEGATIVE
BILIRUB SERPL-MCNC: 0.1 MG/DL (ref 0–1)
BILIRUBIN URINE: NEGATIVE MG/DL
BLOOD, URINE: NEGATIVE
BUN BLDV-MCNC: 19 MG/DL (ref 6–23)
CALCIUM SERPL-MCNC: 8.8 MG/DL (ref 8.3–10.6)
CANNABINOID SCREEN URINE: ABNORMAL
CHLORIDE BLD-SCNC: 103 MMOL/L (ref 99–110)
CLARITY: CLEAR
CO2: 21 MMOL/L (ref 21–32)
COCAINE METABOLITE: NEGATIVE
COLOR: YELLOW
CREAT SERPL-MCNC: 0.9 MG/DL (ref 0.9–1.3)
DIFFERENTIAL TYPE: ABNORMAL
EKG ATRIAL RATE: 58 BPM
EKG ATRIAL RATE: 58 BPM
EKG DIAGNOSIS: NORMAL
EKG DIAGNOSIS: NORMAL
EKG P AXIS: 55 DEGREES
EKG P AXIS: 58 DEGREES
EKG P-R INTERVAL: 168 MS
EKG P-R INTERVAL: 188 MS
EKG Q-T INTERVAL: 434 MS
EKG Q-T INTERVAL: 450 MS
EKG QRS DURATION: 86 MS
EKG QRS DURATION: 90 MS
EKG QTC CALCULATION (BAZETT): 426 MS
EKG QTC CALCULATION (BAZETT): 441 MS
EKG R AXIS: 51 DEGREES
EKG R AXIS: 54 DEGREES
EKG T AXIS: 67 DEGREES
EKG T AXIS: 67 DEGREES
EKG VENTRICULAR RATE: 58 BPM
EKG VENTRICULAR RATE: 58 BPM
EOSINOPHILS ABSOLUTE: 0.2 K/CU MM
EOSINOPHILS RELATIVE PERCENT: 3.7 % (ref 0–3)
GFR AFRICAN AMERICAN: >60 ML/MIN/1.73M2
GFR NON-AFRICAN AMERICAN: >60 ML/MIN/1.73M2
GLUCOSE BLD-MCNC: 120 MG/DL (ref 70–99)
GLUCOSE BLD-MCNC: 122 MG/DL (ref 70–99)
GLUCOSE, URINE: NEGATIVE MG/DL
HCT VFR BLD CALC: 37 % (ref 42–52)
HEMOGLOBIN: 12.1 GM/DL (ref 13.5–18)
HYALINE CASTS: 2 /LPF
IMMATURE NEUTROPHIL %: 0.2 % (ref 0–0.43)
INR BLD: 0.93 INDEX
KETONES, URINE: ABNORMAL MG/DL
LACTATE: 1 MMOL/L (ref 0.4–2)
LEUKOCYTE ESTERASE, URINE: NEGATIVE
LIPASE: 17 IU/L (ref 13–60)
LYMPHOCYTES ABSOLUTE: 2.4 K/CU MM
LYMPHOCYTES RELATIVE PERCENT: 37.6 % (ref 24–44)
MAGNESIUM: 2.1 MG/DL (ref 1.8–2.4)
MCH RBC QN AUTO: 31 PG (ref 27–31)
MCHC RBC AUTO-ENTMCNC: 32.7 % (ref 32–36)
MCV RBC AUTO: 94.9 FL (ref 78–100)
MONOCYTES ABSOLUTE: 0.6 K/CU MM
MONOCYTES RELATIVE PERCENT: 9.2 % (ref 0–4)
MUCUS: ABNORMAL HPF
NITRITE URINE, QUANTITATIVE: NEGATIVE
NUCLEATED RBC %: 0 %
OPIATES, URINE: NEGATIVE
OXYCODONE: NEGATIVE
PDW BLD-RTO: 12.9 % (ref 11.7–14.9)
PH, URINE: 6 (ref 5–8)
PHENCYCLIDINE, URINE: NEGATIVE
PLATELET # BLD: 347 K/CU MM (ref 140–440)
PMV BLD AUTO: 9.2 FL (ref 7.5–11.1)
POTASSIUM SERPL-SCNC: 4.2 MMOL/L (ref 3.5–5.1)
PRO-BNP: 87.69 PG/ML
PROTEIN UA: NEGATIVE MG/DL
PROTHROMBIN TIME: 12 SECONDS (ref 11.7–14.5)
RBC # BLD: 3.9 M/CU MM (ref 4.6–6.2)
RBC URINE: ABNORMAL /HPF (ref 0–3)
SEGMENTED NEUTROPHILS ABSOLUTE COUNT: 3.1 K/CU MM
SEGMENTED NEUTROPHILS RELATIVE PERCENT: 48.2 % (ref 36–66)
SODIUM BLD-SCNC: 135 MMOL/L (ref 135–145)
SPECIFIC GRAVITY UA: 1.01 (ref 1–1.03)
SQUAMOUS EPITHELIAL: <1 /HPF
T4 FREE: 1.06 NG/DL (ref 0.9–1.8)
TOTAL CK: 66 IU/L (ref 38–174)
TOTAL IMMATURE NEUTOROPHIL: 0.01 K/CU MM
TOTAL NUCLEATED RBC: 0 K/CU MM
TOTAL PROTEIN: 7.1 GM/DL (ref 6.4–8.2)
TROPONIN T: <0.01 NG/ML
TROPONIN T: <0.01 NG/ML
TSH HIGH SENSITIVITY: 1.78 UIU/ML (ref 0.27–4.2)
UROBILINOGEN, URINE: NORMAL MG/DL (ref 0.2–1)
WBC # BLD: 6.4 K/CU MM (ref 4–10.5)
WBC UA: <1 /HPF (ref 0–2)

## 2022-04-11 PROCEDURE — 6360000002 HC RX W HCPCS: Performed by: EMERGENCY MEDICINE

## 2022-04-11 PROCEDURE — 96361 HYDRATE IV INFUSION ADD-ON: CPT

## 2022-04-11 PROCEDURE — 93005 ELECTROCARDIOGRAM TRACING: CPT | Performed by: EMERGENCY MEDICINE

## 2022-04-11 PROCEDURE — 83690 ASSAY OF LIPASE: CPT

## 2022-04-11 PROCEDURE — 81001 URINALYSIS AUTO W/SCOPE: CPT

## 2022-04-11 PROCEDURE — 83880 ASSAY OF NATRIURETIC PEPTIDE: CPT

## 2022-04-11 PROCEDURE — 2580000003 HC RX 258: Performed by: PHYSICIAN ASSISTANT

## 2022-04-11 PROCEDURE — 74174 CTA ABD&PLVS W/CONTRAST: CPT

## 2022-04-11 PROCEDURE — 85025 COMPLETE CBC W/AUTO DIFF WBC: CPT

## 2022-04-11 PROCEDURE — 99285 EMERGENCY DEPT VISIT HI MDM: CPT

## 2022-04-11 PROCEDURE — 96372 THER/PROPH/DIAG INJ SC/IM: CPT

## 2022-04-11 PROCEDURE — G0480 DRUG TEST DEF 1-7 CLASSES: HCPCS

## 2022-04-11 PROCEDURE — 93010 ELECTROCARDIOGRAM REPORT: CPT | Performed by: INTERNAL MEDICINE

## 2022-04-11 PROCEDURE — 2580000003 HC RX 258: Performed by: INTERNAL MEDICINE

## 2022-04-11 PROCEDURE — 80053 COMPREHEN METABOLIC PANEL: CPT

## 2022-04-11 PROCEDURE — G0378 HOSPITAL OBSERVATION PER HR: HCPCS

## 2022-04-11 PROCEDURE — 85730 THROMBOPLASTIN TIME PARTIAL: CPT

## 2022-04-11 PROCEDURE — 83735 ASSAY OF MAGNESIUM: CPT

## 2022-04-11 PROCEDURE — 85610 PROTHROMBIN TIME: CPT

## 2022-04-11 PROCEDURE — 6360000002 HC RX W HCPCS: Performed by: PHYSICIAN ASSISTANT

## 2022-04-11 PROCEDURE — 82962 GLUCOSE BLOOD TEST: CPT

## 2022-04-11 PROCEDURE — 71045 X-RAY EXAM CHEST 1 VIEW: CPT

## 2022-04-11 PROCEDURE — 84484 ASSAY OF TROPONIN QUANT: CPT

## 2022-04-11 PROCEDURE — 6370000000 HC RX 637 (ALT 250 FOR IP): Performed by: EMERGENCY MEDICINE

## 2022-04-11 PROCEDURE — 93308 TTE F-UP OR LMTD: CPT

## 2022-04-11 PROCEDURE — 83605 ASSAY OF LACTIC ACID: CPT

## 2022-04-11 PROCEDURE — 6370000000 HC RX 637 (ALT 250 FOR IP): Performed by: PHYSICIAN ASSISTANT

## 2022-04-11 PROCEDURE — 82550 ASSAY OF CK (CPK): CPT

## 2022-04-11 PROCEDURE — 80307 DRUG TEST PRSMV CHEM ANLYZR: CPT

## 2022-04-11 PROCEDURE — 84443 ASSAY THYROID STIM HORMONE: CPT

## 2022-04-11 PROCEDURE — 84439 ASSAY OF FREE THYROXINE: CPT

## 2022-04-11 PROCEDURE — 36415 COLL VENOUS BLD VENIPUNCTURE: CPT

## 2022-04-11 PROCEDURE — 6360000004 HC RX CONTRAST MEDICATION: Performed by: EMERGENCY MEDICINE

## 2022-04-11 PROCEDURE — 2580000003 HC RX 258: Performed by: EMERGENCY MEDICINE

## 2022-04-11 PROCEDURE — 96374 THER/PROPH/DIAG INJ IV PUSH: CPT

## 2022-04-11 RX ORDER — SODIUM CHLORIDE 9 MG/ML
INJECTION, SOLUTION INTRAVENOUS PRN
Status: DISCONTINUED | OUTPATIENT
Start: 2022-04-11 | End: 2022-04-12 | Stop reason: HOSPADM

## 2022-04-11 RX ORDER — POLYETHYLENE GLYCOL 3350 17 G/17G
17 POWDER, FOR SOLUTION ORAL DAILY
Status: DISCONTINUED | OUTPATIENT
Start: 2022-04-11 | End: 2022-04-12 | Stop reason: HOSPADM

## 2022-04-11 RX ORDER — ATORVASTATIN CALCIUM 80 MG/1
80 TABLET, FILM COATED ORAL NIGHTLY
Status: DISCONTINUED | OUTPATIENT
Start: 2022-04-11 | End: 2022-04-12 | Stop reason: HOSPADM

## 2022-04-11 RX ORDER — ONDANSETRON 4 MG/1
4 TABLET, ORALLY DISINTEGRATING ORAL EVERY 8 HOURS PRN
Status: DISCONTINUED | OUTPATIENT
Start: 2022-04-11 | End: 2022-04-12 | Stop reason: HOSPADM

## 2022-04-11 RX ORDER — POLYETHYLENE GLYCOL 3350 17 G/17G
17 POWDER, FOR SOLUTION ORAL DAILY PRN
Status: DISCONTINUED | OUTPATIENT
Start: 2022-04-11 | End: 2022-04-12 | Stop reason: HOSPADM

## 2022-04-11 RX ORDER — PANTOPRAZOLE SODIUM 40 MG/1
40 TABLET, DELAYED RELEASE ORAL
Status: DISCONTINUED | OUTPATIENT
Start: 2022-04-12 | End: 2022-04-12 | Stop reason: HOSPADM

## 2022-04-11 RX ORDER — ASPIRIN 81 MG/1
81 TABLET, CHEWABLE ORAL DAILY
Status: DISCONTINUED | OUTPATIENT
Start: 2022-04-12 | End: 2022-04-12 | Stop reason: HOSPADM

## 2022-04-11 RX ORDER — ACETAMINOPHEN 650 MG/1
650 SUPPOSITORY RECTAL EVERY 6 HOURS PRN
Status: DISCONTINUED | OUTPATIENT
Start: 2022-04-11 | End: 2022-04-12 | Stop reason: HOSPADM

## 2022-04-11 RX ORDER — ACETAMINOPHEN 325 MG/1
650 TABLET ORAL EVERY 6 HOURS PRN
Status: DISCONTINUED | OUTPATIENT
Start: 2022-04-11 | End: 2022-04-12 | Stop reason: HOSPADM

## 2022-04-11 RX ORDER — ALBUTEROL SULFATE 90 UG/1
2 AEROSOL, METERED RESPIRATORY (INHALATION) EVERY 6 HOURS PRN
Status: DISCONTINUED | OUTPATIENT
Start: 2022-04-11 | End: 2022-04-12 | Stop reason: HOSPADM

## 2022-04-11 RX ORDER — 0.9 % SODIUM CHLORIDE 0.9 %
1000 INTRAVENOUS SOLUTION INTRAVENOUS ONCE
Status: COMPLETED | OUTPATIENT
Start: 2022-04-11 | End: 2022-04-11

## 2022-04-11 RX ORDER — SODIUM CHLORIDE 0.9 % (FLUSH) 0.9 %
5-40 SYRINGE (ML) INJECTION EVERY 12 HOURS SCHEDULED
Status: DISCONTINUED | OUTPATIENT
Start: 2022-04-11 | End: 2022-04-12 | Stop reason: HOSPADM

## 2022-04-11 RX ORDER — MECLIZINE HYDROCHLORIDE 25 MG/1
25 TABLET ORAL ONCE
Status: COMPLETED | OUTPATIENT
Start: 2022-04-11 | End: 2022-04-11

## 2022-04-11 RX ORDER — LISINOPRIL 5 MG/1
5 TABLET ORAL DAILY
Status: DISCONTINUED | OUTPATIENT
Start: 2022-04-12 | End: 2022-04-12 | Stop reason: HOSPADM

## 2022-04-11 RX ORDER — CLOPIDOGREL BISULFATE 75 MG/1
75 TABLET ORAL DAILY
Status: DISCONTINUED | OUTPATIENT
Start: 2022-04-12 | End: 2022-04-12 | Stop reason: HOSPADM

## 2022-04-11 RX ORDER — MORPHINE SULFATE/0.9% NACL/PF 1 MG/ML
2 SYRINGE (ML) INJECTION EVERY 30 MIN PRN
Status: DISCONTINUED | OUTPATIENT
Start: 2022-04-11 | End: 2022-04-12 | Stop reason: HOSPADM

## 2022-04-11 RX ORDER — LANOLIN ALCOHOL/MO/W.PET/CERES
500 CREAM (GRAM) TOPICAL DAILY
Status: DISCONTINUED | OUTPATIENT
Start: 2022-04-12 | End: 2022-04-12 | Stop reason: HOSPADM

## 2022-04-11 RX ORDER — ONDANSETRON 2 MG/ML
4 INJECTION INTRAMUSCULAR; INTRAVENOUS EVERY 6 HOURS PRN
Status: DISCONTINUED | OUTPATIENT
Start: 2022-04-11 | End: 2022-04-12 | Stop reason: HOSPADM

## 2022-04-11 RX ORDER — NITROGLYCERIN 0.4 MG/1
0.4 TABLET SUBLINGUAL EVERY 5 MIN PRN
Status: DISCONTINUED | OUTPATIENT
Start: 2022-04-11 | End: 2022-04-12 | Stop reason: HOSPADM

## 2022-04-11 RX ORDER — SODIUM CHLORIDE 9 MG/ML
INJECTION, SOLUTION INTRAVENOUS CONTINUOUS
Status: DISCONTINUED | OUTPATIENT
Start: 2022-04-11 | End: 2022-04-12 | Stop reason: HOSPADM

## 2022-04-11 RX ORDER — ONDANSETRON 2 MG/ML
4 INJECTION INTRAMUSCULAR; INTRAVENOUS EVERY 30 MIN PRN
Status: DISCONTINUED | OUTPATIENT
Start: 2022-04-11 | End: 2022-04-11 | Stop reason: SDUPTHER

## 2022-04-11 RX ORDER — NICOTINE 21 MG/24HR
1 PATCH, TRANSDERMAL 24 HOURS TRANSDERMAL DAILY
Status: DISCONTINUED | OUTPATIENT
Start: 2022-04-11 | End: 2022-04-12 | Stop reason: HOSPADM

## 2022-04-11 RX ORDER — MECLIZINE HYDROCHLORIDE 25 MG/1
12.5 TABLET ORAL 3 TIMES DAILY PRN
Status: DISCONTINUED | OUTPATIENT
Start: 2022-04-11 | End: 2022-04-12 | Stop reason: HOSPADM

## 2022-04-11 RX ORDER — RANOLAZINE 500 MG/1
1000 TABLET, EXTENDED RELEASE ORAL 2 TIMES DAILY
Status: DISCONTINUED | OUTPATIENT
Start: 2022-04-11 | End: 2022-04-12 | Stop reason: HOSPADM

## 2022-04-11 RX ORDER — SODIUM CHLORIDE 0.9 % (FLUSH) 0.9 %
5-40 SYRINGE (ML) INJECTION PRN
Status: DISCONTINUED | OUTPATIENT
Start: 2022-04-11 | End: 2022-04-12 | Stop reason: HOSPADM

## 2022-04-11 RX ADMIN — POLYETHYLENE GLYCOL (3350) 17 G: 17 POWDER, FOR SOLUTION ORAL at 17:32

## 2022-04-11 RX ADMIN — ENOXAPARIN SODIUM 40 MG: 40 INJECTION SUBCUTANEOUS at 20:59

## 2022-04-11 RX ADMIN — ATORVASTATIN CALCIUM 80 MG: 80 TABLET, FILM COATED ORAL at 20:59

## 2022-04-11 RX ADMIN — MECLIZINE HYDROCHLORIDE 25 MG: 25 TABLET ORAL at 17:32

## 2022-04-11 RX ADMIN — METOPROLOL TARTRATE 12.5 MG: 25 TABLET, FILM COATED ORAL at 20:59

## 2022-04-11 RX ADMIN — SODIUM CHLORIDE 1000 ML: 9 INJECTION, SOLUTION INTRAVENOUS at 15:18

## 2022-04-11 RX ADMIN — RANOLAZINE 1000 MG: 500 TABLET, FILM COATED, EXTENDED RELEASE ORAL at 20:59

## 2022-04-11 RX ADMIN — SODIUM CHLORIDE, PRESERVATIVE FREE 10 ML: 5 INJECTION INTRAVENOUS at 20:59

## 2022-04-11 RX ADMIN — ONDANSETRON 4 MG: 2 INJECTION INTRAMUSCULAR; INTRAVENOUS at 15:24

## 2022-04-11 RX ADMIN — IOPAMIDOL 80 ML: 755 INJECTION, SOLUTION INTRAVENOUS at 14:55

## 2022-04-11 RX ADMIN — SODIUM CHLORIDE 1000 ML: 9 INJECTION, SOLUTION INTRAVENOUS at 14:30

## 2022-04-11 RX ADMIN — SODIUM CHLORIDE: 9 INJECTION, SOLUTION INTRAVENOUS at 17:32

## 2022-04-11 ASSESSMENT — PAIN SCALES - GENERAL
PAINLEVEL_OUTOF10: 0
PAINLEVEL_OUTOF10: 0

## 2022-04-11 ASSESSMENT — ENCOUNTER SYMPTOMS
SHORTNESS OF BREATH: 1
BACK PAIN: 1
NAUSEA: 1
ALLERGIC/IMMUNOLOGIC NEGATIVE: 1
EYES NEGATIVE: 1

## 2022-04-11 NOTE — ED NOTES
Report called at this time to [facility]. All questions answered.       Vladimir Nunez RN  04/11/22 9825

## 2022-04-11 NOTE — H&P
V2.0  History and Physical      Name:  Nguyen Pepper /Age/Sex: 1950  (73 y.o. male)   MRN & CSN:  [id number] & [id number] Encounter Date/Time: 2022 3:49 PM EDT   Location:  [facility] PCP: Craline Kirk MD       Hospital Day: 1    Assessment and Plan:   Nguyen Pepper is a 73 y.o. male with a past medical history of CAD, COPD who presents with chest pain. Chest Pain r/o ACS  History of CAD s/p PCI in 2016  -Presented with midsternal chest pressure, diaphoresis, ill-appearing in ED; symptoms relieved with nitro and full dose aspirin  - EKG with sinus bradycardia, no acute ischemic change   - trop T neg x1  - CXR with no acute process  - CTA Chest/abdomen with no acute process   - Heart score 6  - continue Plavix, ASA, statin, BB, ACEi  - Dr. Ashwini Xiong evaluated in ED, limited echo in ED with no reported abnormality, trend trop, monitor on tele    Presyncope   - in setting of above   - started on gentle IVF, check orthostats  - limited echo as above   - monitor on telemetry   - cardiology following     Constipation   - noted on CT  - benign abdominal exam   - start bowel regimen     COPD   - no signs or symptoms of acute exacerbation   - continue PRN bronchodilators     Cannabis abuse  -Reports occasional marijuana use, smoked marijuana prior to presentation to the ER and appeared under the influence on admission   -UDS pending  - recommend cessation     Tobacco use  - smokes 2ppd  - nicotine patch ordered   - counseled on cessation     Vertigo   - [facility] issue, [facility]  - continue PRN meclizine     Right ankle injury  -Currently in walking boot, [facility]    This patient was seen and examined in conjunction with Dr. Alejandro Rangel. He was agreeable with the plan and management as dictated above.          Disposition:   Current Living situation: home with wife  Expected Disposition: same  Estimated D/C: likely 1-2 days    Diet No diet orders on file   DVT Prophylaxis [x] Lovenox, []  Heparin, [] SCDs, [] Ambulation,  [] Eliquis, [] Xarelto   Code Status Prior   Surrogate Decision Maker/ POA      History from:     patient      History of Present Illness:     Chief Complaint: Hesham Atkins is a 73 y.o. male who presents with chest pain. Presented to the emergency department with complaints of chest pain. Not a very forthcoming historian however reports that today he began experiencing chest pressure without radiation. He had associated diaphoresis and lightheadedness. He states this felt similar to when he needed a stent in the past.  At the time, he became lightheaded and felt like he may pass out. He states his had 3 presyncopal episodes in the last several weeks. Denies fall or injury. Does report that he smoked marijuana prior to presentation to the hospital.  In the emergency department, he was given nitro and full dose aspirin with relief of his pain. Currently denies any chest pain, lightheadedness or dizziness. He was evaluated by cardiology in the ER and received at bedside limited echo that was without acute abnormality. He denies recent illness, cough, nausea, vomiting, diarrhea. Reports he is still smoking up to 2 packs of cigarettes per day. Review of Systems:   Ten point ROS reviewed negative, unless as noted above    Objective:   No intake or output data in the 24 hours ending 04/11/22 1549   Vitals:   Vitals:    04/11/22 1419   BP: 115/77   Pulse: 60   Resp: 20   SpO2: 95%       Medications Prior to Admission     Prior to Admission medications    Medication Sig Start Date End Date Taking?  Authorizing Provider   atorvastatin (LIPITOR) 80 MG tablet Take 80 mg by mouth daily    Historical Provider, MD   aspirin 81 MG chewable tablet Take 1 tablet by mouth daily 6/11/20   Michele Laird MD   omeprazole (PRILOSEC) 20 MG delayed release capsule Take 20 mg by mouth daily    Historical Provider, MD   clopidogrel (PLAVIX) 75 MG tablet Take 75 mg by mouth daily    Historical Provider, MD   cyanocobalamin 500 MCG tablet Take 500 mcg by mouth daily    Historical Provider, MD   METOPROLOL TARTRATE PO Take 12.5 mg by mouth 2 times daily    Historical Provider, MD   ranolazine (RANEXA) 1000 MG extended release tablet Take 1,000 mg by mouth 2 times daily    Historical Provider, MD   lisinopril (PRINIVIL;ZESTRIL) 5 MG tablet Take 5 mg by mouth daily    Historical Provider, MD   nitroGLYCERIN (NITROSTAT) 0.4 MG SL tablet Place 0.4 mg under the tongue every 5 minutes as needed. Historical Provider, MD   albuterol (PROVENTIL;VENTOLIN) 90 MCG/ACT inhaler Inhale 2 puffs into the lungs every 6 hours as needed. Historical Provider, MD       Physical Exam:   GEN Awake male, sitting upright in bed in no apparent distress. Appears older than stated age. EYES Pupils are equally round. No scleral erythema, discharge, or conjunctivitis. HENT Mucous membranes are moist.  NECK Supple, no apparent thyromegaly or masses. RESP Clear to auscultation, no wheezes, rales or rhonchi. Respirations even and unlabored on RA. CARDIO/VASC   S1/S2 auscultated. Regular rate without appreciable murmurs, rubs, or gallops. Peripheral pulses equal bilaterally and palpable. No peripheral edema. GI Abdomen is soft without significant tenderness, masses, or guarding.  No costovertebral angle tenderness. Castellon catheter is not present. MSK No gross joint deformities. Right ankle boot in place. SKIN Normal coloration, warm, dry. NEURO Cranial nerves appear grossly intact, normal speech, no lateralizing weakness. PSYCH Awake, alert, oriented x 4. Affect appropriate. Past Medical History:   PMHx   Past Medical History:   Diagnosis Date    CAD (coronary artery disease)     [facility]ronic obstructive [facility]) ([facility] [address]     Heart attack ([facility] [address]     Spinal stenosis      PSHX:  has a past surgical history that includes back surgery;  Colon surgery; and Coronary angioplasty with stent. Allergies: Allergies   Allergen Reactions    Ketorolac Tromethamine Hives    Toradol [Ketorolac Tromethamine]      Fam HX:  family history includes COPD in his mother; Cancer in his father and mother; Heart Disease in his father; High Blood Pressure in his father; Kidney Disease in his father; Stroke in his father. Soc HX:   Social History     Socioeconomic History    Marital status:      Spouse name: Not on file    Number of children: Not on file    Years of education: Not on file    Highest education level: Not on file   Occupational History    Not on file   Tobacco Use    Smoking status: Current Every Day Smoker     Packs/day: 1.00     Years: 50.00     Pack years: 50.00     Types: Cigarettes    Smokeless tobacco: Never Used   Vaping Use    Vaping Use: Some days    Substances: Always   Substance and Sexual Activity    Alcohol use: Yes     Comment: socially    Drug use: Yes     Types: Marijuana Steffany Beat)    Sexual activity: Yes   Other Topics Concern    Not on file   Social History Narrative    Not on file     Social Determinants of Health     Financial Resource Strain:     Difficulty of Paying Living Expenses: Not on file   Food Insecurity:     Worried About Running Out of Food in the Last Year: Not on file    Gopal of Food in the Last Year: Not on file   Transportation Needs:     Lack of Transportation (Medical): Not on file    Lack of Transportation (Non-Medical):  Not on file   Physical Activity:     Days of Exercise per Week: Not on file    Minutes of Exercise per Session: Not on file   Stress:     Feeling of Stress : Not on file   Social Connections:     Frequency of Communication with Friends and Family: Not on file    Frequency of Social Gatherings with Friends and Family: Not on file    Attends Restorationism Services: Not on file    Active Member of Clubs or Organizations: Not on file    Attends Club or Organization Meetings: Not on file    Marital Status: Not on file   Intimate Partner Violence:     Fear of Current or Ex-Partner: Not on file    Emotionally Abused: Not on file    Physically Abused: Not on file    Sexually Abused: Not on file   Housing Stability:     Unable to Pay for Housing in the Last Year: Not on file    Number of Places Lived in the Last Year: Not on file    Unstable Housing in the Last Year: Not on file       Medications:   Medications:    sodium chloride  1,000 mL IntraVENous Once    meclizine  25 mg Oral Once      Infusions:    sodium chloride       PRN Meds: ondansetron, 4 mg, Q30 Min PRN  morphine, 2 mg, Q30 Min PRN        Labs    CBC:   Recent Labs     04/11/22  1416   WBC 6.4   HGB 12.1*        BMP:    Recent Labs     04/11/22  1416      K 4.2      CO2 21   BUN 19   CREATININE 0.9   GLUCOSE 122*     Hepatic:   Recent Labs     04/11/22  1416   AST 15   ALT 12   BILITOT 0.1   ALKPHOS 61     Lipids:   Lab Results   Component Value Date    CHOL 176 02/21/2013    HDL 39 02/21/2013    TRIG 122 02/21/2013     Hemoglobin A1C: No results found for: LABA1C  TSH: No results found for: TSH  Troponin:   Lab Results   Component Value Date    TROPONINT <0.010 04/11/2022    TROPONINT <0.010 06/10/2020    TROPONINT <0.010 10/14/2019     Lactic Acid: No results for input(s): LACTA in the last 72 hours.   BNP:   Recent Labs     04/11/22  1416   PROBNP 87.69     UA:  Lab Results   Component Value Date    NITRU NEGATIVE 06/10/2020    COLORU YELLOW 06/10/2020    WBCUA <1 06/10/2020    RBCUA <1 06/10/2020    MUCUS RARE 06/10/2020    TRICHOMONAS NONE SEEN 06/10/2020    BACTERIA NEGATIVE 06/10/2020    CLARITYU CLEAR 06/10/2020    SPECGRAV 1.016 06/10/2020    LEUKOCYTESUR NEGATIVE 06/10/2020    UROBILINOGEN NORMAL 06/10/2020    BILIRUBINUR NEGATIVE 06/10/2020    BLOODU NEGATIVE 06/10/2020    KETUA NEGATIVE 06/10/2020     Urine Cultures: No results found for: LABURIN  Blood Cultures: No results found for: BC  No results found for: BLOODCULT2  Organism: No results found for: ORG    Imaging/Diagnostics Last 24 Hours   XR CHEST PORTABLE    Result Date: 4/11/2022  EXAMINATION: ONE XRAY VIEW OF THE CHEST 4/11/2022 2:27 pm COMPARISON: Chest x-ray Lulu 10, 2020 HISTORY: ORDERING SYSTEM PROVIDED HISTORY: CP TECHNOLOGIST PROVIDED HISTORY: Reason for exam:->CP Reason for Exam: cp FINDINGS: Cardiac silhouette is stable. Chronic interstitial changes of the lungs. No focal consolidation, pleural effusion, or pneumothorax identified. Osseous structures appear stable. 1. No acute cardiopulmonary process identified. CTA CHEST ABDOMEN PELVIS W CONTRAST    Result Date: 4/11/2022  EXAMINATION: CTA OF THE CHEST, ABDOMEN AND PELVIS WITH CONTRAST 4/11/2022 2:53 pm TECHNIQUE: CTA of the chest, abdomen and pelvis was performed after the administration of intravenous contrast.  Multiplanar reformatted images are provided for review. MIP images are provided for review. Dose modulation, iterative reconstruction, and/or weight based adjustment of the mA/kV was utilized to reduce the radiation dose to as low as reasonably achievable. COMPARISON: None. HISTORY: ORDERING SYSTEM PROVIDED HISTORY: chest pain/dyspnea TECHNOLOGIST PROVIDED HISTORY: Reason for exam:->chest pain/dyspnea Decision Support Exception - unselect if not a suspected or confirmed emergency medical condition->Emergency Medical Condition (MA) Reason for Exam: chest pain/dyspnea Relevant Medical/Surgical History: 80 ml isovue 370 FINDINGS: Nonvascular Mediastinum: There is no evidence for mediastinal or hilar lymphadenopathy. There are calcified mediastinal and hilar lymph nodes. There are no defects involving the major pulmonary arteries. The thoracic aorta is not aneurysmal.  No dissection is seen. Lungs/pleura: The lung parenchyma demonstrates scattered calcified granulomas. No focal infiltrates or masses are seen. No pleural effusions or pneumothoraces are noted.   There are dependent areas of atelectasis. Organs: There are a few small cysts within the liver. The spleen, gallbladder, pancreas and adrenal glands appear unremarkable. There is a low-attenuation nodule within the right adrenal gland measuring 10 mm. There is symmetric enhancement of the kidneys. No hydronephrosis is seen. No ureteral or bladder calculi are seen. GI/Bowel: Evaluation of the bowel is limited as no enteric contrast was given. No dilated loops of bowel are seen. There is a large amount of stool seen distal to the transverse colon. The appendix is not dilated. Pelvis: No pelvic masses or fluid collections are seen. Peritoneum/Retroperitoneum: The abdominal aorta is not aneurysmal.  There are shotty mesenteric and retroperitoneal lymph nodes but no lymphadenopathy is seen. Bones/Soft Tissues: There are degenerative changes involving the spine. No acute bony abnormalities are noted. VASCULAR The thoracic aorta is not aneurysmal.  No dissection is seen. The origins of the great vessels appear unremarkable. The abdominal aorta is also not aneurysmal.  No dissection is seen. No significant stenosis is seen. There is atherosclerotic disease involving the abdominal aorta,, common iliac, external iliac and common femoral arteries. However, no significant stenosis is seen. There are single renal arteries bilaterally with no renal artery stenosis. The celiac, superior mesenteric and inferior mesenteric arteries are all patent without significant stenosis. 1. No evidence for thoracic or abdominal aortic aneurysm or dissection. 2. No active pulmonary disease. 3. Constipation distal to the transverse colon. Otherwise, no acute intra-abdominal or pelvic abnormality. 4. Low-attenuation nodule within the right adrenal gland likely representing an adenoma. I discussed this patient with the ED provider and Dr. Carri Dawson. I did a review of patient's medical records, lab results and imaging conducted today.  I personally reviewed patient's vital signs including pulse ox and EKG.      Electronically signed by Annetta Rodriguez PA-C on 4/11/2022 at 3:49 PM

## 2022-04-11 NOTE — ED PROVIDER NOTES
[address]      TRIAGE CHIEF COMPLAINT:   Chest Pain (x1 nitro and 324 ASA)      [place]:  Abraham Richardson is a 73 y.o. male that presents by EMS with complaint of chest pain shortness of breath, diaphoresis. Patient before arrival had chest pain shortness of breath back pain diaphoresis nausea lightheaded, dizzy. Patient was given nitro and aspirin by EMS. This did relieve his pain he now has nausea some lightheadedness and dizziness. He denies any heart or lung problems. Denies other question concerns no history of DVT PE or AAA. He is very lethargic on arrival he is awake and talking but sleeping lethargic. Denies other questions or concerns. Patient taken to CT scan after arrival.    REVIEW OF SYSTEMS:  At least 10 systems reviewed and otherwise acutely negative except as in the [address] Review of Systems   Constitutional: Positive for activity change and fatigue. HENT: Negative. Eyes: Negative. Respiratory: Positive for shortness of breath. Cardiovascular: Positive for chest pain. Gastrointestinal: Positive for nausea. Endocrine: Negative. Genitourinary: Negative. Musculoskeletal: Positive for back pain. Skin: Negative. Allergic/Immunologic: Negative. Neurological: Positive for dizziness and light-headedness. Hematological: Negative. Psychiatric/Behavioral: Negative. All other systems reviewed and are negative.       Past Medical History:   Diagnosis Date    CAD (coronary artery disease)     COPD (chronic obstructive pulmonary disease) ([address]     Heart attack [facility] [facility]     Spinal stenosis      Past Surgical History:   Procedure Laterality Date    BACK SURGERY      COLON SURGERY      CORONARY ANGIOPLASTY WITH STENT PLACEMENT      PCI RCA in 2009 and R PDA in 2015, + one more stent at a separate occasion (updated 10/2019) - all 3 done at the [facility]     Family History   Problem Relation Age of Onset    COPD Mother     Cancer Mother    Cee Cancer Father     Heart Disease Father     High Blood Pressure Father     Stroke Father     Kidney Disease Father      Social History     Socioeconomic History    Marital status:      Spouse name: Not on file    Number of children: Not on file    Years of education: Not on file    Highest education level: Not on file   Occupational History    Not on file   Tobacco Use    Smoking status: Current Every Day Smoker     Packs/day: 1.00     Years: 50.00     Pack years: 50.00     Types: Cigarettes    Smokeless tobacco: Never Used   Vaping Use    Vaping Use: Some days    Substances: Always   Substance and Sexual Activity    Alcohol use: Yes     Comment: socially    Drug use: Yes     Types: Marijuana [employer])    Sexual activity: Yes   Other Topics Concern    Not on file   Social History Narrative    Not on file     Social Determinants of Health     Financial Resource Strain:     Difficulty of Paying Living Expenses: Not on file   Food Insecurity:     Worried About Running Out of Food in the Last Year: Not on file    Gopal of Food in the Last Year: Not on file   Transportation Needs:     Lack of Transportation (Medical): Not on file    Lack of Transportation (Non-Medical):  Not on file   Physical Activity:     Days of Exercise per Week: Not on file    Minutes of Exercise per Session: Not on file   Stress:     Feeling of Stress : Not on file   Social Connections:     Frequency of Communication with Friends and Family: Not on file    Frequency of Social Gatherings with Friends and Family: Not on file    Attends [employer] Services: Not on file    Active Member of Clubs or Organizations: Not on file    Attends Club or Organization Meetings: Not on file    Marital Status: Not on file   Intimate Partner Violence:     Fear of Current or Ex-Partner: Not on file    Emotionally Abused: Not on file    Physically Abused: Not on file    Sexually Abused: Not on file   Housing Stability:     Unable to Pay for Housing in the Last Year: Not on file    Number of Places Lived in the Last Year: Not on file    Unstable Housing in the Last Year: Not on file     Current Facility-Administered Medications   Medication Dose Route Frequency Provider Last Rate Last Admin    0.9 % sodium chloride bolus  1,000 mL IntraVENous Once Ladene La Nena, DO 1,000 mL/hr at 04/11/22 1518 1,000 mL at 04/11/22 1518    ondansetron (ZOFRAN) injection 4 mg  4 mg IntraVENous Q30 Min PRN Ladene La Nena, DO   4 mg at 04/11/22 1524    morphine injection 2 mg  2 mg IntraVENous Q30 Min PRN Ladene La Nena, DO        meclizine (ANTIVERT) tablet 25 mg  25 mg Oral Once Ladene La Nena, DO        0.9 % sodium chloride infusion   IntraVENous Continuous Dipika Archibald MD         Current Outpatient Medications   Medication Sig Dispense Refill    atorvastatin (LIPITOR) 80 MG tablet Take 80 mg by mouth daily      aspirin 81 MG chewable tablet Take 1 tablet by mouth daily 30 tablet 3    omeprazole (PRILOSEC) 20 MG delayed release capsule Take 20 mg by mouth daily      clopidogrel (PLAVIX) 75 MG tablet Take 75 mg by mouth daily      cyanocobalamin 500 MCG tablet Take 500 mcg by mouth daily      METOPROLOL TARTRATE PO Take 12.5 mg by mouth 2 times daily      ranolazine (RANEXA) 1000 MG extended release tablet Take 1,000 mg by mouth 2 times daily      lisinopril (PRINIVIL;ZESTRIL) 5 MG tablet Take 5 mg by mouth daily      nitroGLYCERIN (NITROSTAT) 0.4 MG SL tablet Place 0.4 mg under the tongue every 5 minutes as needed.  albuterol (PROVENTIL;VENTOLIN) 90 MCG/ACT inhaler Inhale 2 puffs into the lungs every 6 hours as needed.         Allergies   Allergen Reactions    Ketorolac Tromethamine Hives    Toradol [Ketorolac Tromethamine]      Current Facility-Administered Medications   Medication Dose Route Frequency Provider Last Rate Last Admin    0.9 % sodium chloride bolus  1,000 mL IntraVENous Once Ladene La Nena, DO 1,000 mL/hr at 04/11/22 1518 1,000 mL at 04/11/22 1518    ondansetron (ZOFRAN) injection 4 mg  4 mg IntraVENous Q30 Min PRN [place], DO   4 mg at 04/11/22 1524    morphine injection 2 mg  2 mg IntraVENous Q30 Min PRN [place], DO        meclizine (ANTIVERT) tablet 25 mg  25 mg Oral Once [place], DO        0.9 % sodium chloride infusion   IntraVENous Continuous Roger Azul MD         Current Outpatient Medications   Medication Sig Dispense Refill    atorvastatin (LIPITOR) 80 MG tablet Take 80 mg by mouth daily      aspirin 81 MG chewable tablet Take 1 tablet by mouth daily 30 tablet 3    omeprazole (PRILOSEC) 20 MG delayed release capsule Take 20 mg by mouth daily      clopidogrel (PLAVIX) 75 MG tablet Take 75 mg by mouth daily      cyanocobalamin 500 MCG tablet Take 500 mcg by mouth daily      METOPROLOL TARTRATE PO Take 12.5 mg by mouth 2 times daily      ranolazine (RANEXA) 1000 MG extended release tablet Take 1,000 mg by mouth 2 times daily      lisinopril (PRINIVIL;ZESTRIL) 5 MG tablet Take 5 mg by mouth daily      nitroGLYCERIN (NITROSTAT) 0.4 MG SL tablet Place 0.4 mg under the tongue every 5 minutes as needed.  albuterol (PROVENTIL;VENTOLIN) 90 MCG/ACT inhaler Inhale 2 puffs into the lungs every 6 hours as needed. Nursing Notes Reviewed    VITAL SIGNS:  ED Triage Vitals   Enc Vitals Group      BP       Pulse       Resp       Temp       Temp src       SpO2       Weight       Height       Head Circumference       Peak Flow       Pain Score       Pain Loc       Pain Edu? Excl. in [address]? PHYSICAL EXAM:  Physical Exam  Vitals and nursing note reviewed. Constitutional:       General: He is not in acute distress. Appearance: Normal appearance. He is well-developed and well-groomed. He is ill-appearing. He is not toxic-appearing or diaphoretic. HENT:      Head: Normocephalic and atraumatic.       Right Ear: External ear normal.      Left Ear: External ear normal. Cardiovascular:      Rate and Rhythm: Normal rate and regular rhythm. Abdominal:      Palpations: There is no pulsatile mass. Tenderness: There is no abdominal tenderness. There is no guarding or rebound. Negative signs include Kee's sign, Rovsing's sign and McBurney's sign. Musculoskeletal:         General: No swelling, tenderness, deformity or signs of injury. Normal range of motion. Cervical back: Normal range of motion. No rigidity. Right lower leg: No edema. Neurological:      Mental Status: He is oriented to person, place, and time. He is lethargic. GCS: GCS eye subscore is 4. GCS verbal subscore is 5. GCS motor subscore is 6. Cranial Nerves: Cranial nerves are intact. No cranial nerve deficit, dysarthria or facial asymmetry. Sensory: Sensation is intact. No sensory deficit. Motor: Motor function is intact. No weakness, tremor, atrophy, abnormal muscle tone or seizure activity. Coordination: Coordination is intact. Psychiatric:         Behavior: Behavior is cooperative.            I have reviewed andinterpreted all of the currently available lab results from this visit (if applicable):    Results for orders placed or performed during the hospital encounter of 04/11/22   CBC with Auto Differential   Result Value Ref Range    WBC 6.4 4.0 - 10.5 K/CU MM    RBC 3.90 (L) 4.6 - 6.2 M/CU MM    Hemoglobin 12.1 (L) 13.5 - 18.0 GM/DL    Hematocrit 37.0 (L) 42 - 52 %    MCV 94.9 78 - 100 FL    MCH 31.0 27 - 31 PG    MCHC 32.7 32.0 - 36.0 %    RDW 12.9 11.7 - 14.9 %    Platelets 128 584 - 387 K/CU MM    MPV 9.2 7.5 - 11.1 FL    Differential Type AUTOMATED DIFFERENTIAL     Segs Relative 48.2 36 - 66 %    Lymphocytes % 37.6 24 - 44 %    Monocytes % 9.2 (H) 0 - 4 %    Eosinophils % 3.7 (H) 0 - 3 %    Basophils % 1.1 (H) 0 - 1 %    Segs Absolute 3.1 K/CU MM    Lymphocytes Absolute 2.4 K/CU MM    Monocytes Absolute 0.6 K/CU MM    Eosinophils Absolute 0.2 K/CU MM    Basophils Absolute 0.1 K/CU MM    Nucleated RBC % 0.0 %    Total Nucleated RBC 0.0 K/CU MM    Total Immature Neutrophil 0.01 K/CU MM    Immature Neutrophil % 0.2 0 - 0.43 %   Comprehensive Metabolic Panel   Result Value Ref Range    Sodium 135 135 - 145 MMOL/L    Potassium 4.2 3.5 - 5.1 MMOL/L    Chloride 103 99 - 110 mMol/L    CO2 21 21 - 32 MMOL/L    BUN 19 6 - 23 MG/DL    CREATININE 0.9 0.9 - 1.3 MG/DL    Glucose 122 (H) 70 - 99 MG/DL    Calcium 8.8 8.3 - 10.6 MG/DL    Albumin 3.6 3.4 - 5.0 GM/DL    Total Protein 7.1 6.4 - 8.2 GM/DL    Total Bilirubin 0.1 0.0 - 1.0 MG/DL    ALT 12 10 - 40 U/L    AST 15 15 - 37 IU/L    Alkaline Phosphatase 61 40 - 129 IU/L    GFR Non-African American >60 >60 mL/min/1.73m2    GFR African American >60 >60 mL/min/1.73m2    Anion Gap 11 4 - 16   Troponin   Result Value Ref Range    Troponin T <0.010 <0.01 NG/ML   Brain Natriuretic Peptide   Result Value Ref Range    Pro-BNP 87.69 <300 PG/ML   Lipase   Result Value Ref Range    Lipase 17 13 - 60 IU/L   Lactic Acid   Result Value Ref Range    Lactate 1.0 0.4 - 2.0 mMOL/L   CK   Result Value Ref Range    Total CK 66 38 - 174 IU/L   Protime/INR & PTT   Result Value Ref Range    Protime 12.0 11.7 - 14.5 SECONDS    INR 0.93 INDEX    aPTT 25.4 25.1 - 37.1 SECONDS   Magnesium   Result Value Ref Range    Magnesium 2.1 1.8 - 2.4 mg/dl   POCT Glucose   Result Value Ref Range    POC Glucose 120 (H) 70 - 99 MG/DL   EKG 12 Lead   Result Value Ref Range    Ventricular Rate 58 BPM    Atrial Rate 58 BPM    P-R Interval 188 ms    QRS Duration 86 ms    Q-T Interval 450 ms    QTc Calculation (Bazett) 441 ms    P Axis 58 degrees    R Axis 54 degrees    T Axis 67 degrees    Diagnosis       Sinus bradycardia  Otherwise normal ECG  When compared with ECG of 11-APR-2022 14:20,  No significant change was found     EKG 12 Lead   Result Value Ref Range    Ventricular Rate 58 BPM    Atrial Rate 58 BPM    P-R Interval 168 ms    QRS Duration 90 ms    Q-T Interval 434 ms QTc Calculation (Bazett) 426 ms    P Axis 55 degrees    R Axis 51 degrees    T Axis 67 degrees    Diagnosis       Sinus bradycardia  Otherwise normal ECG  When compared with ECG of 10-LUIS-2020 14:24,  No significant change was found          Radiographs (if obtained):  [] The following radiograph was interpreted by myself in the absence of a radiologist:  [x] Radiologist's Report Reviewed:    CXR, CTA CAP    XR ANKLE LEFT (MIN 3 VIEWS)    Result Date: 4/1/2022  EXAMINATION: THREE XRAY VIEWS OF THE LEFT FOOT; THREE XRAY VIEWS OF THE LEFT ANKLE 4/1/2022 6:30 am COMPARISON: None. HISTORY: ORDERING SYSTEM PROVIDED HISTORY: heard a pop with ankle range of motion yesterday morning, constant pain medially since TECHNOLOGIST PROVIDED HISTORY: PORTABLE Reason for exam:->heard a pop with ankle range of motion yesterday morning, constant pain medially since Reason for Exam: heard a pop with ankle range of motion yesterday morning, constant pain medially since FINDINGS: There is no acute fracture, dislocation, or bone destruction involving the left ankle. The ankle mortise is well maintained. There is no acute fracture, dislocation, or bone destruction involving the left foot. There is no significant soft tissue swelling. No acute osseous abnormality involving the left ankle or the left foot. XR FOOT LEFT (MIN 3 VIEWS)    Result Date: 4/1/2022  EXAMINATION: THREE XRAY VIEWS OF THE LEFT FOOT; THREE XRAY VIEWS OF THE LEFT ANKLE 4/1/2022 6:30 am COMPARISON: None.  HISTORY: ORDERING SYSTEM PROVIDED HISTORY: heard a pop with ankle range of motion yesterday morning, constant pain medially since TECHNOLOGIST PROVIDED HISTORY: PORTABLE Reason for exam:->heard a pop with ankle range of motion yesterday morning, constant pain medially since Reason for Exam: heard a pop with ankle range of motion yesterday morning, constant pain medially since FINDINGS: There is no acute fracture, dislocation, or bone destruction involving the left ankle. The ankle mortise is well maintained. There is no acute fracture, dislocation, or bone destruction involving the left foot. There is no significant soft tissue swelling. No acute osseous abnormality involving the left ankle or the left foot. EKG (if obtained): (All EKG's are interpreted by myself in the absence of a cardiologist)    12 lead EKG per my interpretation #1:  Sinus Bradycardia 58  Axis is   Normal  QTc is  426  There is no specific T wave changes appreciated. There is no specific ST wave changes appreciated. Prior EKG to compare with was not available       12 lead EKG per my interpretation #2:  Sinus Bradycardia 58  Axis is   Normal  QTc is  441  There is no specific T wave changes appreciated. There is no specific ST wave changes appreciated. Prior EKG to compare with was not available     Total critical care time today provided was at least 20 minutes. This excludes seperately billable procedure. Critical care time provided for images giving IV fluids consulting cardiology consulting medicine that required close evaluation and/or intervention with concern for patient decompensation. MDM:    Patient with complaint of chest pain shortness of breath back pain nausea dizzy lightheaded. Again before arrival patient is in a parking lot with his wife when he had sudden onset of the symptoms. EMS was called to give him aspirin, nitro which relieved his chest pain. EKG at that time was normal.  EKG here initially was negative I did do a repeat of 2 minutes later also negative. He does appear very ill he appears lethargic but he is awake sleeping but arousable alert oriented x3 he has no headache no current chest pain or shortness of breath no abdominal pain no new back pain he states he has chronic back pain is good pulses good sensation there is no facial droop no slurred speech. He denies any headache. No vision changes. He was sinus bradycardia.   I did call his cardiologist who saw him in the ER did order an echocardiogram.  Initial troponin is negative allergy echocardiogram also looks normal here in the ER. Patient otherwise stable admit to hospital medicine for ACS rule out, chest pain pending urine drug screen. CT of the chest abdomen pelvis is negative for acute problem other than constipation. CLINICAL IMPRESSION:  Final diagnoses:   Chest pain, unspecified type   Dyspnea and respiratory abnormalities   Bradycardia       (Please note that portions of this note may have been completed with a voice recognition program. Efforts were made to edit the dictations but occasionally words aremis-transcribed.)    DISPOSITION REFERRAL (if applicable):  No follow-up provider specified.     DISPOSITION MEDICATIONS (if applicable):  New Prescriptions    No medications on file          Nelsy Dumont, [facility],   04/11/22 2495

## 2022-04-11 NOTE — ED NOTES
Patient is lethargic and sweating and has a grey color to his skin     Garrett Holcomb, MARY  04/11/22 3203

## 2022-04-11 NOTE — CONSULTS
Dictated -[id number]  Hx of CAD and PCI in 2016  Hx of syncope in past work up negative  Patient is here with multiple complains  Check echo   Check labs  CT chest and abdomen pending  Check drug screen  Will follow    Electronically signed by Palma Pruitt MD on 4/11/22 at 3:33 PM EDT

## 2022-04-12 ENCOUNTER — APPOINTMENT (OUTPATIENT)
Dept: NUCLEAR MEDICINE | Age: 72
End: 2022-04-12
Payer: MEDICARE

## 2022-04-12 VITALS
TEMPERATURE: 97.2 F | RESPIRATION RATE: 14 BRPM | OXYGEN SATURATION: 98 % | WEIGHT: 167.3 LBS | SYSTOLIC BLOOD PRESSURE: 133 MMHG | BODY MASS INDEX: 24.71 KG/M2 | HEART RATE: 75 BPM | DIASTOLIC BLOOD PRESSURE: 80 MMHG

## 2022-04-12 LAB
EKG ATRIAL RATE: 67 BPM
EKG DIAGNOSIS: NORMAL
EKG P AXIS: 44 DEGREES
EKG P-R INTERVAL: 182 MS
EKG Q-T INTERVAL: 388 MS
EKG QRS DURATION: 76 MS
EKG QTC CALCULATION (BAZETT): 409 MS
EKG R AXIS: 24 DEGREES
EKG T AXIS: 45 DEGREES
EKG VENTRICULAR RATE: 67 BPM
HCT VFR BLD CALC: 35.4 % (ref 42–52)
HEMOGLOBIN: 11.4 GM/DL (ref 13.5–18)
LV EF: 56 %
LVEF MODALITY: NORMAL
MCH RBC QN AUTO: 30.8 PG (ref 27–31)
MCHC RBC AUTO-ENTMCNC: 32.2 % (ref 32–36)
MCV RBC AUTO: 95.7 FL (ref 78–100)
PDW BLD-RTO: 12.8 % (ref 11.7–14.9)
PLATELET # BLD: 300 K/CU MM (ref 140–440)
PMV BLD AUTO: 9.2 FL (ref 7.5–11.1)
RBC # BLD: 3.7 M/CU MM (ref 4.6–6.2)
TROPONIN T: <0.01 NG/ML
WBC # BLD: 8.4 K/CU MM (ref 4–10.5)

## 2022-04-12 PROCEDURE — 93010 ELECTROCARDIOGRAM REPORT: CPT | Performed by: INTERNAL MEDICINE

## 2022-04-12 PROCEDURE — 36415 COLL VENOUS BLD VENIPUNCTURE: CPT

## 2022-04-12 PROCEDURE — 93005 ELECTROCARDIOGRAM TRACING: CPT | Performed by: PHYSICIAN ASSISTANT

## 2022-04-12 PROCEDURE — 6370000000 HC RX 637 (ALT 250 FOR IP): Performed by: PHYSICIAN ASSISTANT

## 2022-04-12 PROCEDURE — 6360000002 HC RX W HCPCS: Performed by: INTERNAL MEDICINE

## 2022-04-12 PROCEDURE — 3430000000 HC RX DIAGNOSTIC RADIOPHARMACEUTICAL: Performed by: INTERNAL MEDICINE

## 2022-04-12 PROCEDURE — 85027 COMPLETE CBC AUTOMATED: CPT

## 2022-04-12 PROCEDURE — 93017 CV STRESS TEST TRACING ONLY: CPT

## 2022-04-12 PROCEDURE — 78452 HT MUSCLE IMAGE SPECT MULT: CPT

## 2022-04-12 PROCEDURE — A9500 TC99M SESTAMIBI: HCPCS | Performed by: INTERNAL MEDICINE

## 2022-04-12 PROCEDURE — G0378 HOSPITAL OBSERVATION PER HR: HCPCS

## 2022-04-12 RX ORDER — PANTOPRAZOLE SODIUM 40 MG/1
40 TABLET, DELAYED RELEASE ORAL
Qty: 30 TABLET | Refills: 3 | Status: SHIPPED | OUTPATIENT
Start: 2022-04-13 | End: 2022-04-12

## 2022-04-12 RX ORDER — PANTOPRAZOLE SODIUM 40 MG/1
40 TABLET, DELAYED RELEASE ORAL
Qty: 30 TABLET | Refills: 3 | Status: SHIPPED | OUTPATIENT
Start: 2022-04-13

## 2022-04-12 RX ADMIN — KIT FOR THE PREPARATION OF TECHNETIUM TC99M SESTAMIBI 10 MILLICURIE: 1 INJECTION, POWDER, LYOPHILIZED, FOR SOLUTION PARENTERAL at 09:00

## 2022-04-12 RX ADMIN — PANTOPRAZOLE SODIUM 40 MG: 40 TABLET, DELAYED RELEASE ORAL at 06:33

## 2022-04-12 RX ADMIN — REGADENOSON 0.4 MG: 0.08 INJECTION, SOLUTION INTRAVENOUS at 10:32

## 2022-04-12 RX ADMIN — KIT FOR THE PREPARATION OF TECHNETIUM TC99M SESTAMIBI 30 MILLICURIE: 1 INJECTION, POWDER, LYOPHILIZED, FOR SOLUTION PARENTERAL at 11:00

## 2022-04-12 ASSESSMENT — PAIN SCALES - GENERAL
PAINLEVEL_OUTOF10: 0
PAINLEVEL_OUTOF10: 0

## 2022-04-12 NOTE — CONSULTS
[address] [place] [address], [address] [place] [address]                                  CONSULTATION    PATIENT NAME: Trevor Galloway                    :        1950  MED REC NO:   [id number]                          ROOM:       [id number]  ACCOUNT NO:   [de-identified]                           ADMIT DATE: 2022  PROVIDER:     Sampson Gutierrez MD    CONSULT DATE:  2022    INDICATION:  Chest pain. HISTORY OF PRESENT ILLNESS:  This is a 82-year-old male patient who came  into the hospital with nonspecific complaints. Weakness, fatigueness,  chest pain, nausea present. His mentation was also decreased, but he  was able to answer the questions. The patient had a Zio patch placed back in 2018. Found to have sinus  rhythm and ST-T waves noted. The patient had a stress test done back in  2019. Stress test was negative for ischemia and LV function was  preserved. Echo also showed LV function was preserved at that time. The patient is known to have coronary artery disease present. He had an  angioplasty done of the RCA in  at the [facility] in   also there. Last heart catheterization was in  and I think there  were no patent vessels at that time. PAST MEDICAL HISTORY:  History of coronary artery disease, status post  angioplasty done at the [facility]. Hypertension, hyperlipidemia, COPD, back  issues, and recurrent syncopal episodes in the past.    PAST SURGICAL HISTORY:  Back surgery and colon polyps removed and  angioplasty done of the RCA. SOCIAL HISTORY:  He has a history of smoking present. No alcohol use  present. ALLERGIES:  KETOROLAC, TORADOL. MEDICATIONS:  I am not sure what he takes, but Lipitor 80 mg, aspirin,  Prilosec, Plavix, Toprol 12.5 mg b.i.d., Ranexa 1000 mg b.i.d.,  lisinopril. PHYSICAL EXAMINATION:  GENERAL:  The patient is awake, alert, and answering questions, not in  acute distress.   VITAL SIGNS:  Temperature is afebrile. Pulse is 60, blood pressure is  115/70. HEENT:  Head is atraumatic. Pupils are equal and reactive to light. CHEST:  Equal expansion. LUNGS:  Clear to auscultation. No wheezing or rhonchi. HEART:  Regular rate and rhythm. ABDOMEN:  Soft and nontender. Bowel sounds are present. No  hepatosplenomegaly or guarding present. EXTREMITIES:  No cyanosis or clubbing noted. NEUROLOGIC:  Cranial nerves II through XII grossly intact. EKG shows sinus bradycardia present. No acute ST changes present. LABORATORY DATA:  Lactic acid is 1 and glucose is 120. BNP is 87. CBC  is normal.  Rest of the labs are pending at this time. The patient had a CT chest, abdomen, and pelvis done also. IMPRESSION:  This is a 72-year-old male patient comes in with multiple  nonspecific complaints present. From cardiac stand, at this time is  that I will get an echo and he is known to have coronary artery disease  present. We will make further recommendations based on the hospital  course. We will wait for the CT results to come back.         Patsy Tapia MD    D: 04/11/2022 15:32:20       T: 04/11/2022 19:29:09     [email or web address]  Job#: [id number]     Doc#: [id number]    CC:

## 2022-04-12 NOTE — DISCHARGE SUMMARY
V2.0  Discharge Summary    Name:  Sanchez Diehl /Age/Sex: 1950 (73 y.o. male)   Admit Date: 2022  Discharge Date: 22    MRN & CSN:  [id number] & [id number] Encounter Date and Time 22 1:31 PM EDT    Attending:  Mariela Bautista MD Discharging Provider: Mel Rodriguez - CNP       Hospital Course:     Brief HPI: Sanchez Diehl is a 73 y.o. male with a history of hypertension, CAD, SP PCI in 2016, COPD who presented with chest pain and presyncope episodes. Patient was admitted for cardiac work-up. Troponin level negative x3. Cardiology consulted. Patient's echocardiogram showed LVEF 50 to 55%. Stress test is negative. Patient is pain-free this morning. Patient will be discharged home with cardiology outpatient follow-up. Patient agreed with the above plan. He will continue current home medications. Brief Problem Based Course:   Chest Pain r/o ACS  History of CAD s/p PCI in 2016  - EKG with sinus bradycardia, no acute ischemic change   - trop T neg x3  - CXR with no acute process  - CTA Chest/abdomen with no acute process   - continue Plavix, ASA, statin, BB, ACEi  - appreciate Dr. Sampson Gutierrez  input: Patient has negative stress test today. He is okay to be discharged.   Outpatient follow-up     Presyncope   - in setting of above   -Completed IV fluids.  -No dizziness today     Constipation   - noted on CT  - benign abdominal exam   - start bowel regimen      COPD   - no signs or symptoms of acute exacerbation   - continue PRN bronchodilators      Cannabis abuse  -Reports occasional marijuana use, smoked marijuana prior to presentation to the ER and appeared under the influence on admission   -UDS pending  - recommend cessation      Tobacco use  - smokes 2ppd  - nicotine patch ordered   - counseled on cessation      Vertigo   - chronic issue[facility]  -No focal neurology deficit  - continue PRN meclizine      Right ankle injury  -Currently in walking boot, [facility]         The patient expressed appropriate understanding of, and agreement with the discharge recommendations, medications, and plan.      Consults this admission:  IP CONSULT TO CARDIOLOGY  IP CONSULT TO HOSPITALIST    Discharge Diagnosis:   Chest pain  Presyncope  Constipation  Cannabis abuse  Tobacco abuse  Vertigo      Discharge Instruction:   Follow up appointments: PCP and cardiology  Primary care physician: Sue Mercer MD within 2 weeks  Diet: cardiac diet   Activity: activity as tolerated  Disposition: Discharged to:   [x]Home, [][facility], []SNF, []Acute Rehab, []Hospice   Condition on discharge: Stable  Labs and Tests to be Followed up as an outpatient by PCP or Specialist: Cardiology    Discharge Medications:        Medication List      START taking these medications    pantoprazole 40 MG tablet  Commonly known as: PROTONIX  Take 1 tablet by mouth every morning (before breakfast)  Start taking on: April 13, 2022  Replaces: omeprazole 20 MG delayed release capsule        CONTINUE taking these medications    albuterol 90 MCG/ACT inhaler  Commonly known as: PROVENTIL;VENTOLIN     aspirin 81 MG chewable tablet  Take 1 tablet by mouth daily     atorvastatin 80 MG tablet  Commonly known as: LIPITOR     clopidogrel 75 MG tablet  Commonly known as: PLAVIX     cyanocobalamin 500 MCG tablet     lisinopril 5 MG tablet  Commonly known as: PRINIVIL;ZESTRIL     METOPROLOL TARTRATE PO     nitroGLYCERIN 0.4 MG SL tablet  Commonly known as: NITROSTAT     ranolazine 1000 MG extended release tablet  Commonly known as: RANEXA        STOP taking these medications    omeprazole 20 MG delayed release capsule  Commonly known as: PRILOSEC  Replaced by: pantoprazole 40 MG tablet           Where to Get Your Medications      These medications were sent to [address] [place] [address] 25, [address] [facility] [id number]    Phone: [phone]   · pantoprazole 40 MG tablet        Objective Findings at Discharge:   /80   Pulse 75   Temp 97.2 °F (36.2 °C) (Oral)   Resp 14   Wt 167 lb 4.8 oz (75.9 kg)   SpO2 98%   BMI 24.71 kg/m²       Physical Exam:   General: NAD  Eyes: EOMI  ENT: neck supple  Cardiovascular: Regular rate. Respiratory: Clear to auscultation  Gastrointestinal: Soft, non tender  Genitourinary: no suprapubic tenderness  Musculoskeletal: No edema  Skin: warm, dry  Neuro: Alert. Psych: Mood appropriate. Labs and Imaging   Echocardiogram limited    Result Date: 4/11/2022  Transthoracic Echocardiography Report (TTE)  Demographics   Patient Name       Ellyn Nash   Date of Study       04/11/2022   Date of Birth      1950        Gender              Male   Age                73 year(s)        Race                   Patient Number     [id number]        Room Number         ED17   Visit Number       [id number]   Corporate ID       [id number]   Accession Number   [id number]        Sonographer         Candace Cronin RVT, RDMS   Ordering Physician Vania Guzman MD  Interpreting        Vania Guzman MD                                       Physician  Procedure Type of Study   TTE procedure:ECHOCARDIOGRAM LIMITED. Procedure Date Date: 04/11/2022 Start: 03:31 PM Study Location: Portable Technical Quality: Fair visualization Indications:Chest pain. Patient Status: Routine Height: 69 inches Weight: 175 pounds BSA: 1.95 m2 BMI: 25.84 kg/m2 HR: 73 bpm BP: 115/77 mmHg  Conclusions   Summary  This is a limited echocardiogram.  Left ventricular systolic function is normal.  Ejection fraction is visually estimated at 50-55%. Sclerotic, but non-stenotic aortic valve. No evidence of any pericardial effusion.    Signature   ------------------------------------------------------------------  Electronically signed by Vania Guzman MD (Interpreting physician) on 04/11/2022 at 04:37 PM  ------------------------------------------------------------------   Findings   Left Ventricle  Left ventricular systolic function is normal.  Ejection fraction is visually estimated at 50-55%. Mild left ventricular hypertrophy. No regional wall motion abnormalities. Left ventricle size is normal.   Right Ventricle  Essentially normal right ventricle. Aortic Valve  Sclerotic, but non-stenotic aortic valve. Mitral Valve  Trace mitral regurgitation. Tricuspid Valve  Mild tricuspid regurgitation; RVSP: 33 mmHg. Pulmonic Valve  The pulmonic valve was not well visualized. Pericardial Effusion  No evidence of any pericardial effusion. Pleural Effusion  No evidence of pleural effusion. Miscellaneous  The aorta is within normal limits.   M-Mode/2D Measurements & Calculations   LV Diastolic Dimension:   LV Systolic Dimension:  3.99 cm                   2.55 cm  LV FS:26.5 %              LV Volume Diastolic: 77  LV PW Diastolic: 2.30 cm  ml                       RV Diastolic Dimension:  LV PW Systolic: 7.04 cm   LV Volume Systolic: 30   2.6 cm  Septum Diastolic: 3.93 cm ml  Septum Systolic: 1.18 cm  LV EDV/LV EDV Index: 77                            ml/39 m2LV ESV/LV ESV                            Index: 30 ml/15 m2  LV Area Diastolic: 28 cm2 EF Calculated (A4C): 61  LV Area Systolic: 15 cm2  %                            EF Calculated (2D): 53 %                             LV Length: 8.45 cm  Doppler Measurements & Calculations                               Estimated RVSP: 33 mmHg                              Estimated RAP:3 mmHg    Estimated PASP: 17.29 mmHg TR Velocity:189 cm/s                              TR Gradient:14.29 mmHg      XR CHEST PORTABLE    Result Date: 4/11/2022  EXAMINATION: ONE XRAY VIEW OF THE CHEST 4/11/2022 2:27 pm COMPARISON: Chest x-ray Lulu 10, 2020 HISTORY: ORDERING SYSTEM PROVIDED HISTORY: CP TECHNOLOGIST PROVIDED HISTORY: Reason for exam:->CP Reason for Exam: cp FINDINGS: Cardiac silhouette is stable. Chronic interstitial changes of the lungs. No focal consolidation, pleural effusion, or pneumothorax identified. Osseous structures appear stable. 1. No acute cardiopulmonary process identified. CTA CHEST ABDOMEN PELVIS W CONTRAST    Result Date: 4/11/2022  EXAMINATION: CTA OF THE CHEST, ABDOMEN AND PELVIS WITH CONTRAST 4/11/2022 2:53 pm TECHNIQUE: CTA of the chest, abdomen and pelvis was performed after the administration of intravenous contrast.  Multiplanar reformatted images are provided for review. MIP images are provided for review. Dose modulation, iterative reconstruction, and/or weight based adjustment of the mA/kV was utilized to reduce the radiation dose to as low as reasonably achievable. COMPARISON: None. HISTORY: ORDERING SYSTEM PROVIDED HISTORY: chest pain/dyspnea TECHNOLOGIST PROVIDED HISTORY: Reason for exam:->chest pain/dyspnea Decision Support Exception - unselect if not a suspected or confirmed emergency medical condition->Emergency Medical Condition (MA) Reason for Exam: chest pain/dyspnea Relevant Medical/Surgical History: 80 ml isovue 370 FINDINGS: Nonvascular Mediastinum: There is no evidence for mediastinal or hilar lymphadenopathy. There are calcified mediastinal and hilar lymph nodes. There are no defects involving the major pulmonary arteries. The thoracic aorta is not aneurysmal.  No dissection is seen. Lungs/pleura: The lung parenchyma demonstrates scattered calcified granulomas. No focal infiltrates or masses are seen. No pleural effusions or pneumothoraces are noted. There are dependent areas of atelectasis. Organs: There are a few small cysts within the liver. The spleen, gallbladder, pancreas and adrenal glands appear unremarkable. There is a low-attenuation nodule within the right adrenal gland measuring 10 mm. There is symmetric enhancement of the kidneys. No hydronephrosis is seen.   No ureteral or bladder calculi are seen. GI/Bowel: Evaluation of the bowel is limited as no enteric contrast was given. No dilated loops of bowel are seen. There is a large amount of stool seen distal to the transverse colon. The appendix is not dilated. Pelvis: No pelvic masses or fluid collections are seen. Peritoneum/Retroperitoneum: The abdominal aorta is not aneurysmal.  There are shotty mesenteric and retroperitoneal lymph nodes but no lymphadenopathy is seen. Bones/Soft Tissues: There are degenerative changes involving the spine. No acute bony abnormalities are noted. VASCULAR The thoracic aorta is not aneurysmal.  No dissection is seen. The origins of the great vessels appear unremarkable. The abdominal aorta is also not aneurysmal.  No dissection is seen. No significant stenosis is seen. There is atherosclerotic disease involving the abdominal aorta,, common iliac, external iliac and common femoral arteries. However, no significant stenosis is seen. There are single renal arteries bilaterally with no renal artery stenosis. The celiac, superior mesenteric and inferior mesenteric arteries are all patent without significant stenosis. 1. No evidence for thoracic or abdominal aortic aneurysm or dissection. 2. No active pulmonary disease. 3. Constipation distal to the transverse colon. Otherwise, no acute intra-abdominal or pelvic abnormality. 4. Low-attenuation nodule within the right adrenal gland likely representing an adenoma.      NM MYOCARDIAL SPECT REST EXERCISE OR RX    Result Date: 4/12/2022  Cardiac Perfusion Imaging   Demographics   Patient Name      Francisco Carlson     Date of study        04/12/2022   Date of Birth     1950          Gender               Male   Age               73 year(s)          Race                    Patient Number    [id number]          Room Number          7240   Visit Number      [id number]           Height               69 inches   Corporate ID      [id number] Weight               175 pounds   Accession Number  [id number]                                         NM Technologist   Ordering          Minh Ritika MARTI  Interpreting         Ayah Hemphill MD  Physician                             Cardiologist   Conclusions   Summary  Normal perfusion uptake noted in the anterior/septal and lateral wall  fixed defect in the inferior wall due to diaphragmatic artifact  no reversible ischemia noted  gating shows EF 56%   Signatures   ------------------------------------------------------------------  Electronically signed by Ayah Hemphill MD (Interpreting  cardiologist) on 04/12/2022 at 13:16  ------------------------------------------------------------------  Procedure Procedure Type:   Nuclear Stress Test:Pharmacological, Myocardial Perfusion Imaging with  Pharm, NM MYOCARDIAL SPECT REST EXERCISE OR RX  Stress Protocols   Resting HR:70 bpm  Stress Protocol:Exercise - Karl  Peak HR:99 bpm                      HR/BP product:60919  Peak BP:138/75 mmHg                 Max exrecise: 1 METS  Predicted HR: 151 bpm  % of predicted HR: 66   Exercise duration: 01:09 min  Reason for termination:Completed   Stress Interpretation  No EKG changes suggestive of ischemia with Lexiscan infusion. Perfusion Interpretation   Normal perfusion uptake noted in the anterior/septal and lateral wall  fixed defect in the inferior wall due to diaphragmatic artifact  no reversible ischemia noted  gating shows EF 56%  Medical History   Accession#:  [id number]  Admission Data Admission date: 04/11/2022 Admission Time: 14:10 Hospital Status: Outpatient.       CBC:   Recent Labs     04/11/22  1416 04/12/22  0835   WBC 6.4 8.4   HGB 12.1* 11.4*    300     BMP:    Recent Labs     04/11/22  1416      K 4.2      CO2 21   BUN 19   CREATININE 0.9   GLUCOSE 122*     Hepatic:   Recent Labs     04/11/22  1416   AST 15   ALT 12   BILITOT 0.1   ALKPHOS 61     Lipids:   Lab Results   Component Value Date    CHOL 176 02/21/2013    HDL 39 02/21/2013    TRIG 122 02/21/2013     Hemoglobin A1C: No results found for: LABA1C  TSH: No results found for: TSH  Troponin:   Lab Results   Component Value Date    TROPONINT <0.010 04/11/2022    TROPONINT <0.010 04/11/2022    TROPONINT <0.010 04/11/2022     Lactic Acid: No results for input(s): LACTA in the last 72 hours.   BNP:   Recent Labs     04/11/22  1416   PROBNP 87.69     UA:  Lab Results   Component Value Date    NITRU NEGATIVE 04/11/2022    COLORU YELLOW 04/11/2022    WBCUA <1 04/11/2022    RBCUA NONE SEEN 04/11/2022    MUCUS RARE 04/11/2022    TRICHOMONAS NONE SEEN 06/10/2020    BACTERIA NEGATIVE 04/11/2022    CLARITYU CLEAR 04/11/2022    SPECGRAV 1.015 04/11/2022    LEUKOCYTESUR NEGATIVE 04/11/2022    UROBILINOGEN NORMAL 04/11/2022    BILIRUBINUR NEGATIVE 04/11/2022    BLOODU NEGATIVE 04/11/2022    KETUA TRACE 04/11/2022     Urine Cultures: No results found for: LABURIN  Blood Cultures: No results found for: BC  No results found for: BLOODCULT2  Organism: No results found for: ORG    Time Spent Discharging patient 35 minutes    Electronically signed by Maegan Stout CNP on 4/12/2022 at 1:31 PM

## 2022-04-12 NOTE — PROGRESS NOTES
Daily Progress Note  No acute events overnight   HR and BP stable   NSR per tele  Waiting for results of nuclear stress test   If stress test is negative, pt. Is stable from cardiac standpoint to d/c    Chest pain w/ hx of CAD    S/p angioplasty of RCA in 2009 and RPDA branch    in 2015   Troponin negative x 2, no need to trend further   EKG not indicative of ischemia    CXR and CT w/o acute cardiopulmonary process   Echo limited but showed sclerotic aortic valve   Urine and blood tox screen negative   Continue Plavix, ASA, lipitor, lopressor, lisinopril,    ranexa   Continue to monitor on tele    Will be able to make recommendations upon    results of nuclear stress test     Presyncope    Gentle hydration    Awaiting results of orthostatics     UA negative for UTI  Anemic with Hgb of 11.4- continue to monitor     Nuclear Stress Test 04/12/2022   Pending    Echo 04/11/2022   This is a limited echocardiogram.   Left ventricular systolic function is normal.   Ejection fraction is visually estimated at 50-55%. Sclerotic, but non-stenotic aortic valve. No evidence of any pericardial effusion. CXR 04/11/2022   No acute cardiopulmonary process identified. CT CAP w/ Contrast   1. No evidence for thoracic or abdominal aortic aneurysm or dissection. 2. No active pulmonary disease. 3. Constipation distal to the transverse colon.  Otherwise, no acute   intra-abdominal or pelvic abnormality. 4. Low-attenuation nodule within the right adrenal gland likely representing   an adenoma. PAST MEDICAL HISTORY:  History of coronary artery disease, status post angioplasty done at the [place]. Hypertension, hyperlipidemia, COPD, back  issues, and recurrent syncopal episodes in the past.     PAST SURGICAL HISTORY:  Back surgery and colon polyps removed and angioplasty done of the RCA.     SOCIAL HISTORY:  He has a history of smoking present.   No alcohol use present.     ALLERGIES:  KETOROLAC, TORADOL.     MEDICATIONS:  I 04/11/22  1416   AST 15   ALT 12   LIPASE 17   BILITOT 0.1   ALKPHOS 61     PT/INR:   Recent Labs     04/11/22  1416   PROTIME 12.0   INR 0.93     APTT:   Recent Labs     04/11/22  1416   APTT 25.4     BNP:  No results for input(s): BNP in the last 72 hours.       Assessment:  Patient Active Problem List    Diagnosis Date Noted    Encephalopathy acute 06/10/2020    Syncope and collapse 11/18/2019    Tobacco abuse 11/18/2019    ASCVD (arteriosclerotic cardiovascular disease) 11/18/2019    Stented coronary artery 10/20/2019    Cardiac monitoring 10/20/2019    Chest pain 10/13/2019       Electronically signed by SHANNON You CNP on 4/12/2022 at 10:46 AM  Agree with above assessment and plan we will review nuclear stress test once completed

## 2024-08-25 ENCOUNTER — APPOINTMENT (OUTPATIENT)
Dept: CT IMAGING | Age: 74
End: 2024-08-25
Payer: COMMERCIAL

## 2024-08-25 ENCOUNTER — APPOINTMENT (OUTPATIENT)
Dept: GENERAL RADIOLOGY | Age: 74
End: 2024-08-25
Attending: STUDENT IN AN ORGANIZED HEALTH CARE EDUCATION/TRAINING PROGRAM
Payer: COMMERCIAL

## 2024-08-25 ENCOUNTER — HOSPITAL ENCOUNTER (EMERGENCY)
Age: 74
Discharge: HOME | End: 2024-08-26
Attending: STUDENT IN AN ORGANIZED HEALTH CARE EDUCATION/TRAINING PROGRAM
Payer: COMMERCIAL

## 2024-08-25 DIAGNOSIS — U07.1 COVID: Primary | ICD-10-CM

## 2024-08-25 LAB
ALBUMIN SERPL-MCNC: 3.9 GM/DL (ref 3.4–5)
ALCOHOL SCREEN SERUM: <0.01 %WT/VOL
ALP BLD-CCNC: 67 IU/L (ref 40–129)
ALT SERPL-CCNC: 13 U/L (ref 10–40)
ANION GAP SERPL CALCULATED.3IONS-SCNC: 8 MMOL/L (ref 7–16)
AST SERPL-CCNC: 16 IU/L (ref 15–37)
BASOPHILS ABSOLUTE: 0.1 K/CU MM
BASOPHILS RELATIVE PERCENT: 1.7 % (ref 0–1)
BILIRUB SERPL-MCNC: 0.2 MG/DL (ref 0–1)
BUN SERPL-MCNC: 12 MG/DL (ref 6–23)
CALCIUM SERPL-MCNC: 8.9 MG/DL (ref 8.3–10.6)
CHLORIDE BLD-SCNC: 101 MMOL/L (ref 99–110)
CO2: 24 MMOL/L (ref 21–32)
CREAT SERPL-MCNC: 0.8 MG/DL (ref 0.9–1.3)
DIFFERENTIAL TYPE: ABNORMAL
EOSINOPHILS ABSOLUTE: 0.1 K/CU MM
EOSINOPHILS RELATIVE PERCENT: 1.4 % (ref 0–3)
GFR, ESTIMATED: >90 ML/MIN/1.73M2
GLUCOSE SERPL-MCNC: 85 MG/DL (ref 70–99)
HCT VFR BLD CALC: 37.9 % (ref 42–52)
HEMOGLOBIN: 12.4 GM/DL (ref 13.5–18)
IMMATURE NEUTROPHIL %: 0.2 % (ref 0–0.43)
INFLUENZA A ANTIGEN: NOT DETECTED
INFLUENZA B ANTIGEN: NOT DETECTED
LIPASE: 20 IU/L (ref 13–60)
LYMPHOCYTES ABSOLUTE: 0.7 K/CU MM
LYMPHOCYTES RELATIVE PERCENT: 12.7 % (ref 24–44)
MAGNESIUM: 2.3 MG/DL (ref 1.8–2.4)
MCH RBC QN AUTO: 30.8 PG (ref 27–31)
MCHC RBC AUTO-ENTMCNC: 32.7 % (ref 32–36)
MCV RBC AUTO: 94.3 FL (ref 78–100)
MONOCYTES ABSOLUTE: 0.7 K/CU MM
MONOCYTES RELATIVE PERCENT: 11.1 % (ref 0–4)
NEUTROPHILS ABSOLUTE: 4.3 K/CU MM
NEUTROPHILS RELATIVE PERCENT: 72.9 % (ref 36–66)
NUCLEATED RBC %: 0 %
PDW BLD-RTO: 13.2 % (ref 11.7–14.9)
PLATELET # BLD: 252 K/CU MM (ref 140–440)
PMV BLD AUTO: 9.2 FL (ref 7.5–11.1)
POTASSIUM SERPL-SCNC: 4.1 MMOL/L (ref 3.5–5.1)
PROCALCITONIN SERPL-MCNC: 0.04 NG/ML
RBC # BLD: 4.02 M/CU MM (ref 4.6–6.2)
SARS-COV-2 RDRP RESP QL NAA+PROBE: DETECTED
SODIUM BLD-SCNC: 133 MMOL/L (ref 135–145)
SOURCE: ABNORMAL
TOTAL IMMATURE NEUTOROPHIL: 0.01 K/CU MM
TOTAL NUCLEATED RBC: 0 K/CU MM
TOTAL PROTEIN: 7.2 GM/DL (ref 6.4–8.2)
TROPONIN, HIGH SENSITIVITY: 7 NG/L (ref 0–22)
WBC # BLD: 5.8 K/CU MM (ref 4–10.5)

## 2024-08-25 PROCEDURE — 84145 PROCALCITONIN (PCT): CPT

## 2024-08-25 PROCEDURE — 71046 X-RAY EXAM CHEST 2 VIEWS: CPT

## 2024-08-25 PROCEDURE — 83690 ASSAY OF LIPASE: CPT

## 2024-08-25 PROCEDURE — 83735 ASSAY OF MAGNESIUM: CPT

## 2024-08-25 PROCEDURE — 6360000004 HC RX CONTRAST MEDICATION: Performed by: STUDENT IN AN ORGANIZED HEALTH CARE EDUCATION/TRAINING PROGRAM

## 2024-08-25 PROCEDURE — 99285 EMERGENCY DEPT VISIT HI MDM: CPT

## 2024-08-25 PROCEDURE — 74177 CT ABD & PELVIS W/CONTRAST: CPT

## 2024-08-25 PROCEDURE — 87635 SARS-COV-2 COVID-19 AMP PRB: CPT

## 2024-08-25 PROCEDURE — 85025 COMPLETE CBC W/AUTO DIFF WBC: CPT

## 2024-08-25 PROCEDURE — 93005 ELECTROCARDIOGRAM TRACING: CPT | Performed by: STUDENT IN AN ORGANIZED HEALTH CARE EDUCATION/TRAINING PROGRAM

## 2024-08-25 PROCEDURE — 80053 COMPREHEN METABOLIC PANEL: CPT

## 2024-08-25 PROCEDURE — 87502 INFLUENZA DNA AMP PROBE: CPT

## 2024-08-25 PROCEDURE — 84484 ASSAY OF TROPONIN QUANT: CPT

## 2024-08-25 PROCEDURE — G0480 DRUG TEST DEF 1-7 CLASSES: HCPCS

## 2024-08-25 RX ORDER — 0.9 % SODIUM CHLORIDE 0.9 %
30 INTRAVENOUS SOLUTION INTRAVENOUS ONCE
Status: COMPLETED | OUTPATIENT
Start: 2024-08-25 | End: 2024-08-26

## 2024-08-25 RX ORDER — ACETAMINOPHEN 500 MG
1000 TABLET ORAL ONCE
Status: COMPLETED | OUTPATIENT
Start: 2024-08-25 | End: 2024-08-26

## 2024-08-25 RX ORDER — ONDANSETRON 2 MG/ML
4 INJECTION INTRAMUSCULAR; INTRAVENOUS ONCE
Status: COMPLETED | OUTPATIENT
Start: 2024-08-25 | End: 2024-08-26

## 2024-08-25 RX ORDER — IOPAMIDOL 755 MG/ML
75 INJECTION, SOLUTION INTRAVASCULAR
Status: COMPLETED | OUTPATIENT
Start: 2024-08-25 | End: 2024-08-25

## 2024-08-25 RX ADMIN — IOPAMIDOL 75 ML: 755 INJECTION, SOLUTION INTRAVENOUS at 22:59

## 2024-08-25 ASSESSMENT — PAIN SCALES - GENERAL: PAINLEVEL_OUTOF10: 0

## 2024-08-25 ASSESSMENT — PAIN - FUNCTIONAL ASSESSMENT: PAIN_FUNCTIONAL_ASSESSMENT: 0-10

## 2024-08-26 VITALS
OXYGEN SATURATION: 97 % | HEART RATE: 86 BPM | SYSTOLIC BLOOD PRESSURE: 138 MMHG | WEIGHT: 149 LBS | TEMPERATURE: 98.2 F | BODY MASS INDEX: 22 KG/M2 | RESPIRATION RATE: 18 BRPM | DIASTOLIC BLOOD PRESSURE: 80 MMHG

## 2024-08-26 LAB
EKG ATRIAL RATE: 85 BPM
EKG DIAGNOSIS: NORMAL
EKG P AXIS: 31 DEGREES
EKG P-R INTERVAL: 154 MS
EKG Q-T INTERVAL: 330 MS
EKG QRS DURATION: 74 MS
EKG QTC CALCULATION (BAZETT): 392 MS
EKG R AXIS: 21 DEGREES
EKG T AXIS: 54 DEGREES
EKG VENTRICULAR RATE: 85 BPM
LACTIC ACID, SEPSIS: 1.2 MMOL/L (ref 0.4–2)

## 2024-08-26 PROCEDURE — 93010 ELECTROCARDIOGRAM REPORT: CPT | Performed by: INTERNAL MEDICINE

## 2024-08-26 PROCEDURE — 6360000002 HC RX W HCPCS: Performed by: STUDENT IN AN ORGANIZED HEALTH CARE EDUCATION/TRAINING PROGRAM

## 2024-08-26 PROCEDURE — 6370000000 HC RX 637 (ALT 250 FOR IP): Performed by: STUDENT IN AN ORGANIZED HEALTH CARE EDUCATION/TRAINING PROGRAM

## 2024-08-26 PROCEDURE — 2580000003 HC RX 258: Performed by: STUDENT IN AN ORGANIZED HEALTH CARE EDUCATION/TRAINING PROGRAM

## 2024-08-26 PROCEDURE — 96361 HYDRATE IV INFUSION ADD-ON: CPT

## 2024-08-26 PROCEDURE — 83605 ASSAY OF LACTIC ACID: CPT

## 2024-08-26 PROCEDURE — 96374 THER/PROPH/DIAG INJ IV PUSH: CPT

## 2024-08-26 PROCEDURE — 87040 BLOOD CULTURE FOR BACTERIA: CPT

## 2024-08-26 RX ORDER — ONDANSETRON 4 MG/1
4 TABLET, FILM COATED ORAL 3 TIMES DAILY PRN
Qty: 15 TABLET | Refills: 0 | Status: SHIPPED | OUTPATIENT
Start: 2024-08-26

## 2024-08-26 RX ADMIN — SODIUM CHLORIDE 2028 ML: 9 INJECTION, SOLUTION INTRAVENOUS at 00:26

## 2024-08-26 RX ADMIN — ONDANSETRON 4 MG: 2 INJECTION INTRAMUSCULAR; INTRAVENOUS at 00:27

## 2024-08-26 RX ADMIN — ACETAMINOPHEN 1000 MG: 500 TABLET ORAL at 00:27

## 2024-08-26 ASSESSMENT — PAIN - FUNCTIONAL ASSESSMENT: PAIN_FUNCTIONAL_ASSESSMENT: 0-10

## 2024-08-26 ASSESSMENT — PAIN SCALES - GENERAL
PAINLEVEL_OUTOF10: 0
PAINLEVEL_OUTOF10: 0

## 2024-08-26 NOTE — ED PROVIDER NOTES
Emergency Department Encounter        Pt Name: Paul Davila  MRN: [id number]  Birthdate 1950  Date of evaluation: 8/25/2024  ED Physician: Neto Michaels MD    CHIEF COMPLAINT     Triage Chief Complaint:   Nausea      HISTORY OF PRESENT ILLNESS & REVIEW OF SYSTEMS     History obtained from the patient and staff.    Paul Davila is a 74 y.o. male who presents to the emergency department for evaluation of tiredness.  Patient is a difficult historian.  Says that his wife made him come in because he has been very tired.  Says he has been getting up at 2 or 3 in the morning.  Says that this started yesterday.  Says that he also is coughing up phlegm.  Says he has some nausea.  Denies any vomiting or abdominal pain.  Denies any diarrhea constipation.  Denies any dysuria.  Denies any known sick contacts.  Denies any chest pain shortness of breath.  Says he did start having headache 2 or 3 hours ago that is mild.  Denies any numbness weakness or tingling.  Denies any fevers. Denies any congestion rhinorrhea or sore throat        Patient denies any new Fever, Chills, Chest pain, Shortness of breath, Abdominal pain, Vomiting, Diarrhea, Constipation, and Leg swelling.    The patient has no other acute complaints at this time.  Review of systems as above.          PAST MED/SURG/SOCIAL/FAM HISTORY & ALLERGY & MEDICATIONS     Past Medical History:   Diagnosis Date    CAD (coronary artery disease)     COPD (chronic obstructive pulmonary disease) (HCC)     Heart attack (HCC)     Spinal stenosis      Patient Active Problem List   Diagnosis Code    Chest pain R07.9    Stented coronary artery Z95.5    Cardiac monitoring Z92.89    Syncope and collapse R55    Tobacco abuse Z72.0    ASCVD (arteriosclerotic cardiovascular disease) I25.10    Encephalopathy acute G93.40     Family History   Problem Relation Age of Onset    COPD Mother     Cancer Mother     Cancer Father     Heart Disease Father     High Blood Pressure Father      Stroke Father     Kidney Disease Father      No current facility-administered medications for this encounter.    Current Outpatient Medications:     ondansetron (ZOFRAN) 4 MG tablet, Take 1 tablet by mouth 3 times daily as needed for Nausea or Vomiting, Disp: 15 tablet, Rfl: 0    pantoprazole (PROTONIX) 40 MG tablet, Take 1 tablet by mouth every morning (before breakfast), Disp: 30 tablet, Rfl: 3    atorvastatin (LIPITOR) 80 MG tablet, Take 80 mg by mouth daily, Disp: , Rfl:     aspirin 81 MG chewable tablet, Take 1 tablet by mouth daily, Disp: 30 tablet, Rfl: 3    clopidogrel (PLAVIX) 75 MG tablet, Take 75 mg by mouth daily, Disp: , Rfl:     cyanocobalamin 500 MCG tablet, Take 500 mcg by mouth daily, Disp: , Rfl:     METOPROLOL TARTRATE PO, Take 12.5 mg by mouth 2 times daily, Disp: , Rfl:     ranolazine (RANEXA) 1000 MG extended release tablet, Take 1 tablet by mouth 2 times daily, Disp: , Rfl:     lisinopril (PRINIVIL;ZESTRIL) 5 MG tablet, Take 5 mg by mouth daily, Disp: , Rfl:     nitroGLYCERIN (NITROSTAT) 0.4 MG SL tablet, Place 0.4 mg under the tongue every 5 minutes as needed., Disp: , Rfl:     albuterol (PROVENTIL;VENTOLIN) 90 MCG/ACT inhaler, Inhale 2 puffs into the lungs every 6 hours as needed., Disp: , Rfl:   Discharge Medication List as of 8/26/2024  1:21 AM        CONTINUE these medications which have NOT CHANGED    Details   pantoprazole (PROTONIX) 40 MG tablet Take 1 tablet by mouth every morning (before breakfast), Disp-30 tablet, R-3Print      atorvastatin (LIPITOR) 80 MG tablet Take 80 mg by mouth dailyHistorical Med      aspirin 81 MG chewable tablet Take 1 tablet by mouth daily, Disp-30 tablet, R-3Normal      clopidogrel (PLAVIX) 75 MG tablet Take 75 mg by mouth dailyHistorical Med      cyanocobalamin 500 MCG tablet Take 500 mcg by mouth dailyHistorical Med      METOPROLOL TARTRATE PO Take 12.5 mg by mouth 2 times dailyHistorical Med      ranolazine (RANEXA) 1000 MG extended release tablet

## 2024-08-26 NOTE — DISCHARGE INSTRUCTIONS
You can use Tylenol as needed for pain  You can use Zofran as needed for nausea or vomiting.  Make sure to eat and drink plenty of fluids to stay well-hydrated.  Call and follow-up with your family doctor in the next 1-3 days  Return to the ED if your symptoms worsen or you feel you need to be reevaluated

## 2024-08-31 LAB
CULTURE: NORMAL
CULTURE: NORMAL
Lab: NORMAL
Lab: NORMAL
SPECIMEN: NORMAL
SPECIMEN: NORMAL

## 2025-01-29 ENCOUNTER — APPOINTMENT (OUTPATIENT)
Dept: GENERAL RADIOLOGY | Age: 75
End: 2025-01-29
Payer: MEDICARE

## 2025-01-29 ENCOUNTER — HOSPITAL ENCOUNTER (EMERGENCY)
Age: 75
Discharge: HOME | End: 2025-01-29
Payer: MEDICARE

## 2025-01-29 VITALS
RESPIRATION RATE: 14 BRPM | OXYGEN SATURATION: 98 % | SYSTOLIC BLOOD PRESSURE: 126 MMHG | DIASTOLIC BLOOD PRESSURE: 78 MMHG | HEART RATE: 72 BPM | TEMPERATURE: 97.3 F

## 2025-01-29 DIAGNOSIS — R11.0 NAUSEA: ICD-10-CM

## 2025-01-29 DIAGNOSIS — R07.9 CHEST PAIN, UNSPECIFIED TYPE: Primary | ICD-10-CM

## 2025-01-29 LAB
ALBUMIN SERPL-MCNC: 3.8 G/DL (ref 3.4–5)
ALBUMIN/GLOB SERPL: 1.3 {RATIO} (ref 1.1–2.2)
ALP SERPL-CCNC: 65 U/L (ref 40–129)
ALT SERPL-CCNC: 9 U/L (ref 10–40)
ANION GAP SERPL CALCULATED.3IONS-SCNC: 12 MMOL/L (ref 9–17)
AST SERPL-CCNC: 14 U/L (ref 15–37)
BASOPHILS # BLD: 0.11 K/UL
BASOPHILS NFR BLD: 1 % (ref 0–1)
BILIRUB SERPL-MCNC: 0.2 MG/DL (ref 0–1)
BUN SERPL-MCNC: 15 MG/DL (ref 7–20)
CALCIUM SERPL-MCNC: 9 MG/DL (ref 8.3–10.6)
CHLORIDE SERPL-SCNC: 103 MMOL/L (ref 99–110)
CO2 SERPL-SCNC: 22 MMOL/L (ref 21–32)
CREAT SERPL-MCNC: 0.8 MG/DL (ref 0.8–1.3)
EKG ATRIAL RATE: 69 BPM
EKG DIAGNOSIS: NORMAL
EKG P AXIS: 66 DEGREES
EKG P-R INTERVAL: 172 MS
EKG Q-T INTERVAL: 422 MS
EKG QRS DURATION: 98 MS
EKG QTC CALCULATION (BAZETT): 452 MS
EKG R AXIS: 67 DEGREES
EKG T AXIS: 81 DEGREES
EKG VENTRICULAR RATE: 69 BPM
EOSINOPHIL # BLD: 0.11 K/UL
EOSINOPHILS RELATIVE PERCENT: 1 % (ref 0–3)
ERYTHROCYTE [DISTWIDTH] IN BLOOD BY AUTOMATED COUNT: 13.6 % (ref 11.7–14.9)
GFR, ESTIMATED: >90 ML/MIN/1.73M2
GLUCOSE BLD-MCNC: 130 MG/DL (ref 74–99)
GLUCOSE BLD-MCNC: 90 MG/DL
GLUCOSE SERPL-MCNC: 140 MG/DL (ref 74–99)
HCT VFR BLD AUTO: 36.8 % (ref 42–52)
HGB BLD-MCNC: 11.9 G/DL (ref 13.5–18)
IMM GRANULOCYTES # BLD AUTO: 0.02 K/UL
IMM GRANULOCYTES NFR BLD: 0 %
LYMPHOCYTES NFR BLD: 2.59 K/UL
LYMPHOCYTES RELATIVE PERCENT: 31 % (ref 24–44)
MCH RBC QN AUTO: 30.4 PG (ref 27–31)
MCHC RBC AUTO-ENTMCNC: 32.3 G/DL (ref 32–36)
MCV RBC AUTO: 94.1 FL (ref 78–100)
MONOCYTES NFR BLD: 0.47 K/UL
MONOCYTES NFR BLD: 6 % (ref 0–4)
NEUTROPHILS NFR BLD: 61 % (ref 36–66)
NEUTS SEG NFR BLD: 5.07 K/UL
PLATELET # BLD AUTO: 326 K/UL (ref 140–440)
PMV BLD AUTO: 9.7 FL (ref 7.5–11.1)
POTASSIUM SERPL-SCNC: 4 MMOL/L (ref 3.5–5.1)
PROT SERPL-MCNC: 6.6 G/DL (ref 6.4–8.2)
RBC # BLD AUTO: 3.91 M/UL (ref 4.6–6.2)
SODIUM SERPL-SCNC: 136 MMOL/L (ref 136–145)
TROPONIN I SERPL HS-MCNC: 11 NG/L (ref 0–22)
TROPONIN I SERPL HS-MCNC: 8 NG/L (ref 0–22)
WBC OTHER # BLD: 8.4 K/UL (ref 4–10.5)

## 2025-01-29 PROCEDURE — 93010 ELECTROCARDIOGRAM REPORT: CPT | Performed by: INTERNAL MEDICINE

## 2025-01-29 PROCEDURE — 82962 GLUCOSE BLOOD TEST: CPT

## 2025-01-29 PROCEDURE — 85025 COMPLETE CBC W/AUTO DIFF WBC: CPT

## 2025-01-29 PROCEDURE — 93005 ELECTROCARDIOGRAM TRACING: CPT | Performed by: STUDENT IN AN ORGANIZED HEALTH CARE EDUCATION/TRAINING PROGRAM

## 2025-01-29 PROCEDURE — 71045 X-RAY EXAM CHEST 1 VIEW: CPT

## 2025-01-29 PROCEDURE — 99285 EMERGENCY DEPT VISIT HI MDM: CPT

## 2025-01-29 PROCEDURE — 80053 COMPREHEN METABOLIC PANEL: CPT

## 2025-01-29 PROCEDURE — 84484 ASSAY OF TROPONIN QUANT: CPT

## 2025-01-29 RX ORDER — ONDANSETRON 2 MG/ML
4 INJECTION INTRAMUSCULAR; INTRAVENOUS ONCE
Status: DISCONTINUED | OUTPATIENT
Start: 2025-01-29 | End: 2025-01-29 | Stop reason: HOSPADM

## 2025-01-29 RX ORDER — HYDROCODONE BITARTRATE AND ACETAMINOPHEN 5; 325 MG/1; MG/1
1 TABLET ORAL
Status: DISCONTINUED | OUTPATIENT
Start: 2025-01-29 | End: 2025-01-29 | Stop reason: HOSPADM

## 2025-01-29 RX ORDER — ASPIRIN 81 MG/1
324 TABLET, CHEWABLE ORAL ONCE
Status: DISCONTINUED | OUTPATIENT
Start: 2025-01-29 | End: 2025-01-29 | Stop reason: HOSPADM

## 2025-01-29 ASSESSMENT — PAIN SCALES - GENERAL: PAINLEVEL_OUTOF10: 0

## 2025-01-29 ASSESSMENT — ENCOUNTER SYMPTOMS
ABDOMINAL PAIN: 0
VOMITING: 1
CHEST TIGHTNESS: 1
SHORTNESS OF BREATH: 0
NAUSEA: 1

## 2025-01-29 ASSESSMENT — PAIN - FUNCTIONAL ASSESSMENT: PAIN_FUNCTIONAL_ASSESSMENT: NONE - DENIES PAIN

## 2025-01-29 NOTE — ED PROVIDER NOTES
[facility] EMERGENCY DEPARTMENT  EMERGENCY DEPARTMENT ENCOUNTER      Pt Name: Paul Davila  MRN: [id number]  Birthdate 1950  Date of evaluation: 1/29/2025  Provider: SHANNON Romo - CNP  PCP: Tin Castro MD  Note Started: 11:43 AM EST 1/29/25    I am the Primary Clinician of Record.  LAKHWINDER. I have evaluated this patient.      CHIEF COMPLAINT       Chief Complaint   Patient presents with    Chest Pain     Was driving and started feeling chest pressure, found by , states he was passed out    Nausea     4mg Zofran given by EMS       HISTORY OF PRESENT ILLNESS: 1 or more Elements   History from : Patient        Paul Davila is a 74 y.o. male who presents to the emergency department non radiating left sided chest pain.  He was going about his daily activities when he gradulally started noticing the discomfort.  As iti increased he experienced nausea, diaphoresis and feeling like he was going to pass out.  He laid his head down on the steering wheel of his car.  [facility]angers found him.  He denies synope. His of CAD, stents, hypertension, hyperlipidemia, and family hx . Chest pain has subsided    Nursing Notes were all reviewed and agreed with or any disagreements were addressed in the HPI.    REVIEW OF SYSTEMS :    Review of Systems   Constitutional:  Positive for diaphoresis. Negative for fatigue and fever.   Respiratory:  Positive for chest tightness. Negative for shortness of breath.    Cardiovascular:  Positive for chest pain. Negative for palpitations and leg swelling.   Gastrointestinal:  Positive for nausea and vomiting. Negative for abdominal pain.   Neurological:  Negative for facial asymmetry and headaches. Syncope: near syncope.    Positives and Pertinent negatives as per HPI.     SURGICAL HISTORY     Past Surgical History:   Procedure Laterality Date    BACK SURGERY      COLON SURGERY      CORONARY ANGIOPLASTY WITH STENT PLACEMENT      PCI RCA in 2009 and

## 2025-01-29 NOTE — DISCHARGE INSTRUCTIONS
[facility] to 3 days.  Return immediately to the emergency department for any worsening signs or symptoms